# Patient Record
Sex: FEMALE | Race: WHITE | NOT HISPANIC OR LATINO | Employment: FULL TIME | URBAN - METROPOLITAN AREA
[De-identification: names, ages, dates, MRNs, and addresses within clinical notes are randomized per-mention and may not be internally consistent; named-entity substitution may affect disease eponyms.]

---

## 2024-10-11 ENCOUNTER — OFFICE VISIT (OUTPATIENT)
Dept: OBSTETRICS AND GYNECOLOGY | Facility: CLINIC | Age: 29
End: 2024-10-11
Payer: COMMERCIAL

## 2024-10-11 VITALS
SYSTOLIC BLOOD PRESSURE: 104 MMHG | WEIGHT: 267.75 LBS | BODY MASS INDEX: 37.48 KG/M2 | DIASTOLIC BLOOD PRESSURE: 80 MMHG | HEIGHT: 71 IN

## 2024-10-11 DIAGNOSIS — Z01.419 ENCOUNTER FOR GYNECOLOGICAL EXAMINATION WITHOUT ABNORMAL FINDING: Primary | ICD-10-CM

## 2024-10-11 DIAGNOSIS — Z31.69 ENCOUNTER FOR PRECONCEPTION CONSULTATION: ICD-10-CM

## 2024-10-11 PROCEDURE — 99999 PR PBB SHADOW E&M-EST. PATIENT-LVL III: CPT | Mod: PBBFAC,,, | Performed by: NURSE PRACTITIONER

## 2024-10-11 NOTE — PROGRESS NOTES
CC: Well woman exam    Jamaica Leonard is a 29 y.o. female  presents for well woman exam.  LMP: Patient's last menstrual period was 2024 (exact date)..    Trying for pregnancy as of this month, has prenatal will start today  Tracking cycles  Has slight spotting today      Health Maintenance Topics with due status: Not Due       Topic Last Completion Date    Cervical Cancer Screening 2023    RSV Vaccine (Age 60+ and Pregnant patients) Not Due     History reviewed. No pertinent past medical history.  Past Surgical History:   Procedure Laterality Date    APPENDECTOMY      TONSILLECTOMY       Social History     Socioeconomic History    Marital status: Single   Tobacco Use    Smoking status: Former     Types: Vaping with nicotine    Smokeless tobacco: Never   Substance and Sexual Activity    Alcohol use: No    Drug use: No    Sexual activity: Yes     Partners: Male     Birth control/protection: Condom     Social Drivers of Health     Financial Resource Strain: Low Risk  (2024)    Overall Financial Resource Strain (CARDIA)     Difficulty of Paying Living Expenses: Not hard at all   Food Insecurity: No Food Insecurity (2024)    Hunger Vital Sign     Worried About Running Out of Food in the Last Year: Never true     Ran Out of Food in the Last Year: Never true   Physical Activity: Insufficiently Active (2024)    Exercise Vital Sign     Days of Exercise per Week: 4 days     Minutes of Exercise per Session: 30 min   Stress: Stress Concern Present (2024)    [employer] - Occupational Stress Questionnaire     Feeling of Stress : To some extent   Housing Stability: Unknown (2024)    Housing Stability Vital Sign     Unable to Pay for Housing in the Last Year: No     Family History   Problem Relation Name Age of Onset    Dementia Paternal Grandmother       OB History          0    Para   0    Term   0            AB        Living             SAB     "    IAB        Ectopic        Multiple        Live Births                     /80   Ht 5' 11" (1.803 m)   Wt 121.5 kg (267 lb 12 oz)   LMP 09/24/2024 (Exact Date)   BMI 37.34 kg/m²       ROS:  Per hpi    PHYSICAL EXAM:  APPEARANCE: Well nourished, well developed, in no acute distress.  AFFECT: WNL, alert and oriented x 3  SKIN: No acne or hirsutism  NECK: Neck symmetric without masses or thyromegaly  NODES: No inguinal, cervical, axillary, or femoral lymph node enlargement  CHEST: Good respiratory effect  ABDOMEN: Soft.  No tenderness or masses.  No hepatosplenomegaly.  No hernias.  BREASTS: Symmetrical, no skin changes or visible lesions.  No palpable masses, nipple discharge bilaterally.  PELVIC: Normal external genitalia without lesions.  Normal hair distribution.  Adequate perineal body, normal urethral meatus.  Vagina moist and well rugated without lesions or discharge- very light spotting noted on exam.  Cervix pink, without lesions, discharge or tenderness.  No significant cystocele or rectocele.  Bimanual exam shows uterus to be normal size, regular, mobile and nontender.  Adnexa without masses or tenderness.    EXTREMITIES: No edema.  Physical Exam    1. Encounter for gynecological examination without abnormal finding         AND PLAN:    Jamaica was seen today for well woman.    Diagnoses and all orders for this visit:    Encounter for gynecological examination without abnormal finding     Start prenatal vitamin  Track cycle  Timed intercourse  Healthy diet and exercise  Reviewed midwife program with pt - she will be going to [facility] [address] once pregnant     Spotting today could be ovulation or possible implantation by her menses keyana - pt to take home upt if no cycle by keyana time - if negative wait a week and take another if still no cycle     Patient was counseled today on A.C.S. Pap guidelines and recommendations for yearly pelvic exams, mammograms and monthly self breast exams; to see her PCP " for other health maintenance.

## 2024-11-04 ENCOUNTER — TELEPHONE (OUTPATIENT)
Dept: OBSTETRICS AND GYNECOLOGY | Facility: CLINIC | Age: 29
End: 2024-11-04

## 2024-11-04 ENCOUNTER — CLINICAL SUPPORT (OUTPATIENT)
Dept: OBSTETRICS AND GYNECOLOGY | Facility: CLINIC | Age: 29
End: 2024-11-04
Payer: COMMERCIAL

## 2024-11-04 DIAGNOSIS — Z32.00 POSSIBLE PREGNANCY: Primary | ICD-10-CM

## 2024-11-04 PROCEDURE — 99999 PR PBB SHADOW E&M-EST. PATIENT-LVL II: CPT | Mod: PBBFAC,,,

## 2024-11-04 NOTE — PROGRESS NOTES
Spoke with patient for a total of 40 minutes during virtual visit.  Updated chart to reflect up to date patient demographics.  Allergies, medications, pharmacy, medical/family history and OB history updated.  Patient was guided through expectations of care during pregnancy.    Pregnancy confirmation, dating u/s & first routine OB appts scheduled.    Education provided & questions answered. Encouraged to send message or call office with any questions/concerns. Verbalized understanding.     Discussed with pt:    Lmp 9/24/24  Encouraged to continue taking PNV   Denies nausea/vomiting  C/o mild cramping/spotting  Precautions discussed  Referred to [email or web address] for Preg A to Z guide & class schedule   Discussed benefits of breastfeeding   Discussed need for pediatrician

## 2024-11-20 ENCOUNTER — TELEPHONE (OUTPATIENT)
Dept: OBSTETRICS AND GYNECOLOGY | Facility: CLINIC | Age: 29
End: 2024-11-20
Payer: COMMERCIAL

## 2024-11-21 ENCOUNTER — PROCEDURE VISIT (OUTPATIENT)
Dept: OBSTETRICS AND GYNECOLOGY | Facility: CLINIC | Age: 29
End: 2024-11-21
Payer: COMMERCIAL

## 2024-11-21 ENCOUNTER — LAB VISIT (OUTPATIENT)
Dept: LAB | Facility: HOSPITAL | Age: 29
End: 2024-11-21
Attending: ADVANCED PRACTICE MIDWIFE
Payer: COMMERCIAL

## 2024-11-21 ENCOUNTER — OFFICE VISIT (OUTPATIENT)
Dept: OBSTETRICS AND GYNECOLOGY | Facility: CLINIC | Age: 29
End: 2024-11-21
Payer: COMMERCIAL

## 2024-11-21 VITALS
BODY MASS INDEX: 36.29 KG/M2 | DIASTOLIC BLOOD PRESSURE: 85 MMHG | SYSTOLIC BLOOD PRESSURE: 133 MMHG | HEIGHT: 71 IN | WEIGHT: 259.25 LBS

## 2024-11-21 DIAGNOSIS — Z34.01 ENCOUNTER FOR SUPERVISION OF NORMAL FIRST PREGNANCY IN FIRST TRIMESTER: ICD-10-CM

## 2024-11-21 DIAGNOSIS — Z34.01 ENCOUNTER FOR SUPERVISION OF NORMAL FIRST PREGNANCY IN FIRST TRIMESTER: Primary | ICD-10-CM

## 2024-11-21 DIAGNOSIS — Z32.00 POSSIBLE PREGNANCY: ICD-10-CM

## 2024-11-21 DIAGNOSIS — O99.210 OBESITY AFFECTING PREGNANCY, ANTEPARTUM, UNSPECIFIED OBESITY TYPE: ICD-10-CM

## 2024-11-21 LAB
ABO + RH BLD: NORMAL
ALBUMIN SERPL BCP-MCNC: 3.9 G/DL (ref 3.5–5.2)
ALP SERPL-CCNC: 41 U/L (ref 40–150)
ALT SERPL W/O P-5'-P-CCNC: 12 U/L (ref 10–44)
ANION GAP SERPL CALC-SCNC: 8 MMOL/L (ref 8–16)
AST SERPL-CCNC: 14 U/L (ref 10–40)
BASOPHILS # BLD AUTO: 0.07 K/UL (ref 0–0.2)
BASOPHILS NFR BLD: 1.1 % (ref 0–1.9)
BILIRUB SERPL-MCNC: 0.2 MG/DL (ref 0.1–1)
BLD GP AB SCN CELLS X3 SERPL QL: NORMAL
BUN SERPL-MCNC: 7 MG/DL (ref 6–20)
CALCIUM SERPL-MCNC: 10.2 MG/DL (ref 8.7–10.5)
CHLORIDE SERPL-SCNC: 109 MMOL/L (ref 95–110)
CO2 SERPL-SCNC: 21 MMOL/L (ref 23–29)
CREAT SERPL-MCNC: 0.8 MG/DL (ref 0.5–1.4)
DIFFERENTIAL METHOD BLD: NORMAL
EOSINOPHIL # BLD AUTO: 0.2 K/UL (ref 0–0.5)
EOSINOPHIL NFR BLD: 3.6 % (ref 0–8)
ERYTHROCYTE [DISTWIDTH] IN BLOOD BY AUTOMATED COUNT: 12.2 % (ref 11.5–14.5)
EST. GFR  (NO RACE VARIABLE): >60 ML/MIN/1.73 M^2
ESTIMATED AVG GLUCOSE: 97 MG/DL (ref 68–131)
GLUCOSE SERPL-MCNC: 84 MG/DL (ref 70–110)
HAV IGM SERPL QL IA: NORMAL
HBA1C MFR BLD: 5 % (ref 4–5.6)
HBV CORE IGM SERPL QL IA: NORMAL
HBV SURFACE AG SERPL QL IA: NORMAL
HCT VFR BLD AUTO: 38.4 % (ref 37–48.5)
HCV AB SERPL QL IA: NORMAL
HGB BLD-MCNC: 12.6 G/DL (ref 12–16)
HIV 1+2 AB+HIV1 P24 AG SERPL QL IA: NORMAL
IMM GRANULOCYTES # BLD AUTO: 0.02 K/UL (ref 0–0.04)
IMM GRANULOCYTES NFR BLD AUTO: 0.3 % (ref 0–0.5)
LYMPHOCYTES # BLD AUTO: 1.4 K/UL (ref 1–4.8)
LYMPHOCYTES NFR BLD: 21.5 % (ref 18–48)
MCH RBC QN AUTO: 29 PG (ref 27–31)
MCHC RBC AUTO-ENTMCNC: 32.8 G/DL (ref 32–36)
MCV RBC AUTO: 89 FL (ref 82–98)
MONOCYTES # BLD AUTO: 0.5 K/UL (ref 0.3–1)
MONOCYTES NFR BLD: 8 % (ref 4–15)
NEUTROPHILS # BLD AUTO: 4.2 K/UL (ref 1.8–7.7)
NEUTROPHILS NFR BLD: 65.5 % (ref 38–73)
NRBC BLD-RTO: 0 /100 WBC
PLATELET # BLD AUTO: 282 K/UL (ref 150–450)
PMV BLD AUTO: 10.8 FL (ref 9.2–12.9)
POTASSIUM SERPL-SCNC: 3.8 MMOL/L (ref 3.5–5.1)
PROT SERPL-MCNC: 6.6 G/DL (ref 6–8.4)
RBC # BLD AUTO: 4.34 M/UL (ref 4–5.4)
SODIUM SERPL-SCNC: 138 MMOL/L (ref 136–145)
SPECIMEN OUTDATE: NORMAL
TREPONEMA PALLIDUM IGG+IGM AB [PRESENCE] IN SERUM OR PLASMA BY IMMUNOASSAY: NONREACTIVE
WBC # BLD AUTO: 6.46 K/UL (ref 3.9–12.7)

## 2024-11-21 PROCEDURE — 80074 ACUTE HEPATITIS PANEL: CPT | Performed by: ADVANCED PRACTICE MIDWIFE

## 2024-11-21 PROCEDURE — 87491 CHLMYD TRACH DNA AMP PROBE: CPT | Performed by: ADVANCED PRACTICE MIDWIFE

## 2024-11-21 PROCEDURE — 87389 HIV-1 AG W/HIV-1&-2 AB AG IA: CPT | Performed by: ADVANCED PRACTICE MIDWIFE

## 2024-11-21 PROCEDURE — 86850 RBC ANTIBODY SCREEN: CPT | Performed by: ADVANCED PRACTICE MIDWIFE

## 2024-11-21 PROCEDURE — 86593 SYPHILIS TEST NON-TREP QUANT: CPT | Performed by: ADVANCED PRACTICE MIDWIFE

## 2024-11-21 PROCEDURE — 86762 RUBELLA ANTIBODY: CPT | Performed by: ADVANCED PRACTICE MIDWIFE

## 2024-11-21 PROCEDURE — 83036 HEMOGLOBIN GLYCOSYLATED A1C: CPT | Performed by: ADVANCED PRACTICE MIDWIFE

## 2024-11-21 PROCEDURE — 80053 COMPREHEN METABOLIC PANEL: CPT | Performed by: ADVANCED PRACTICE MIDWIFE

## 2024-11-21 PROCEDURE — 87086 URINE CULTURE/COLONY COUNT: CPT | Performed by: ADVANCED PRACTICE MIDWIFE

## 2024-11-21 PROCEDURE — 99999 PR PBB SHADOW E&M-EST. PATIENT-LVL III: CPT | Mod: PBBFAC,,, | Performed by: ADVANCED PRACTICE MIDWIFE

## 2024-11-21 PROCEDURE — 85025 COMPLETE CBC W/AUTO DIFF WBC: CPT | Performed by: ADVANCED PRACTICE MIDWIFE

## 2024-11-21 PROCEDURE — 36415 COLL VENOUS BLD VENIPUNCTURE: CPT | Performed by: ADVANCED PRACTICE MIDWIFE

## 2024-11-21 PROCEDURE — 76801 OB US < 14 WKS SINGLE FETUS: CPT | Mod: S$GLB,,, | Performed by: OBSTETRICS & GYNECOLOGY

## 2024-11-21 RX ORDER — ONDANSETRON 8 MG/1
8 TABLET, ORALLY DISINTEGRATING ORAL EVERY 6 HOURS PRN
Qty: 20 TABLET | Refills: 1 | Status: SHIPPED | OUTPATIENT
Start: 2024-11-21

## 2024-11-21 NOTE — PROGRESS NOTES
CC: Absence of menses risk assessment     Jamaica Leonard is a 29 y.o. female  presents with complaint of absence of menstruation.  She reports nausea/vomiting. denies abdominal pain, denies  bleeding.   Pregnancy was planned and is desired.  Partner is supportive of pregnancy.  Lives at home with .    Works as a .  Denies domestic abuse.  Denies chemical/pesticide/radiation exposure.    OB history:    Ultrasound shows IUP @ 7w0d with EDC 7/10/25, FHT's 122    Past Medical History:   Diagnosis Date    ADHD     Insomnia      Past Surgical History:   Procedure Laterality Date    ADENOIDECTOMY      APPENDECTOMY      TONSILLECTOMY       Social History     Socioeconomic History    Marital status:    Tobacco Use    Smoking status: Former     Types: Vaping with nicotine    Smokeless tobacco: Never   Substance and Sexual Activity    Alcohol use: No    Drug use: No    Sexual activity: Yes     Partners: Male     Social Drivers of Health     Financial Resource Strain: Low Risk  (2024)    Overall Financial Resource Strain (CARDIA)     Difficulty of Paying Living Expenses: Not hard at all   Food Insecurity: No Food Insecurity (2024)    Hunger Vital Sign     Worried About Running Out of Food in the Last Year: Never true     Ran Out of Food in the Last Year: Never true   Physical Activity: Insufficiently Active (2024)    Exercise Vital Sign     Days of Exercise per Week: 4 days     Minutes of Exercise per Session: 30 min   Stress: Stress Concern Present (2024)    [employer] - Occupational Stress Questionnaire     Feeling of Stress : To some extent   Housing Stability: Unknown (2024)    Housing Stability Vital Sign     Unable to Pay for Housing in the Last Year: No     Family History   Problem Relation Name Age of Onset    Diabetes Mother      Dementia Paternal Grandmother       OB History    Para Term  AB Living   0 0 0  "0 0 0   SAB IAB Ectopic Multiple Live Births   0 0 0 0 0       /85   Ht 5' 11" (1.803 m)   Wt 117.6 kg (259 lb 4.2 oz)   LMP 09/24/2024 (Exact Date)   BMI 36.16 kg/m²         ROS:  GENERAL: Denies weight gain or weight loss. Feeling well overall.   CHEST: Denies chest pain or shortness of breath.   CARDIOVASCULAR: Denies palpitations or left sided chest pain.   URINARY: No frequency, dysuria, hematuria, or burning on urination.  REPRODUCTIVE: See HPI.   BREASTS: The patient performs breast self-examination and denies pain, lumps, or nipple discharge.   PSYCHIATRIC: Denies depression, anxiety or mood swings.    PE:   APPEARANCE: Well nourished, well developed, in no acute distress.  AFFECT: WNL, alert and oriented x 3.  SKIN: No acne or hirsutism.  EXTREMITIES: No edema.          ASSESSMENT and PLAN:    ICD-10-CM ICD-9-CM    1. Encounter for supervision of normal first pregnancy in first trimester  Z34.01 V22.0       2. Obesity affecting pregnancy, antepartum, unspecified obesity type  O99.210 649.13 ASA @ 12 weeks          -      Pap smear UTD  -      Patient's medications and medical history reviewed with patient and implications in pregnancy.   -      Pregnancy course discussed.  -      Patient was counseled on exercise in pregnancy and proper weight gain based on the [employer]'s recommendations based on her pre-pregnancy weight.   -      BMI 36.16    Discussed IOM recommended weight gain of:     Underweight  Less than 18.5 28-40      Normal Weight 18.5-24.9  25-35      Overweight  25-29.9  15-25      Obese    30 and greater  11-20     -      Chromosomal abnormality risk discussed and available testing discussed.  Info given to see if will be covered by insurance  -      Oriented to practice including CNM collaboration.   NOB visit in 4 weeks  Early warning S/S reviewed   "

## 2024-11-22 LAB
BACTERIA UR CULT: NORMAL
RUBV IGG SER-ACNC: 44 IU/ML
RUBV IGG SER-IMP: REACTIVE

## 2024-12-12 ENCOUNTER — TELEPHONE (OUTPATIENT)
Dept: OBSTETRICS AND GYNECOLOGY | Facility: CLINIC | Age: 29
End: 2024-12-12
Payer: COMMERCIAL

## 2024-12-12 NOTE — TELEPHONE ENCOUNTER
----- Message from Tarah sent at 12/12/2024  3:12 PM CST -----  Regarding: Medical Release  Contact: Patient  Type:  Needs Medical Advice    Who Called: Patient   Symptoms (please be specific): n/a    How long has patient had these symptoms:  n/a   Pharmacy name and phone #:  n/a   Would the patient rather a call back or a response via [facility]? Call back   Best Call Back Number:  [phone]  Additional Information: Patient is requesting a call back in reference to a medical release needed Patient stated she is currently in the dental office and a release was requested Patient would like release uploaded to [facility] account if possible Please Assist

## 2024-12-12 NOTE — LETTER
December 12, 2024    Jamaica Leonard  [id number]  [place] [address]              [facility]  [id number] [place] [address] [address] [address]  Phone: [phone]  Fax: [phone]      To Whom It May Concern:    Ms. Leonard is currently under our care for pregnancy.  Estimated Date of Delivery: 07/10/25    Any elective dental work should preferably be scheduled during or after the mid-trimester or postpartum.    Dental procedures can be performed with local anesthesia without epinephrine. Recommended antibiotics include penicillins, erythromycin, and cephalosporins. Tylenol #3 or Percocet may be used for pain control. No x-rays are allowed for routine screening. For dental problems, up to four x-rays may be taken with proper abdominal shield.    Sincerely,    Sarah Maldonado CNM/Sue Michele LPN

## 2024-12-19 ENCOUNTER — LAB VISIT (OUTPATIENT)
Dept: LAB | Facility: HOSPITAL | Age: 29
End: 2024-12-19
Attending: MIDWIFE
Payer: COMMERCIAL

## 2024-12-19 ENCOUNTER — PATIENT MESSAGE (OUTPATIENT)
Dept: ADMINISTRATIVE | Facility: OTHER | Age: 29
End: 2024-12-19
Payer: COMMERCIAL

## 2024-12-19 ENCOUNTER — INITIAL PRENATAL (OUTPATIENT)
Dept: OBSTETRICS AND GYNECOLOGY | Facility: CLINIC | Age: 29
End: 2024-12-19
Payer: COMMERCIAL

## 2024-12-19 VITALS
DIASTOLIC BLOOD PRESSURE: 70 MMHG | BODY MASS INDEX: 37.62 KG/M2 | WEIGHT: 269.75 LBS | SYSTOLIC BLOOD PRESSURE: 136 MMHG

## 2024-12-19 DIAGNOSIS — O99.210 OBESITY AFFECTING PREGNANCY, ANTEPARTUM, UNSPECIFIED OBESITY TYPE: ICD-10-CM

## 2024-12-19 DIAGNOSIS — Z13.79 GENETIC TESTING: ICD-10-CM

## 2024-12-19 DIAGNOSIS — Z34.01 ENCOUNTER FOR SUPERVISION OF NORMAL FIRST PREGNANCY IN FIRST TRIMESTER: Primary | ICD-10-CM

## 2024-12-19 PROCEDURE — 36415 COLL VENOUS BLD VENIPUNCTURE: CPT | Mod: PN | Performed by: MIDWIFE

## 2024-12-19 PROCEDURE — 99999 PR PBB SHADOW E&M-EST. PATIENT-LVL II: CPT | Mod: PBBFAC,,, | Performed by: MIDWIFE

## 2024-12-19 NOTE — PROGRESS NOTES
29 y.o. female  at 11w0d   Denies VB, LOF or cramping  Doing well, first trimester s/s improving. Still with some fatigue, but otherwise feeling normal.   TWG: 10.5 lbs     1. Encounter for supervision of normal first pregnancy in first trimester  -     Connected MOM Enrollment  -     [facility] Connected MOM Program Consent Questionnaire    2. Genetic testing  -     [id number] Plus W/ Sex Chromosome Abnormalities* T21, T18, T13 & Y + X; Future; Expected date: 2024    3. Obesity affecting pregnancy, antepartum, unspecified obesity type  - TWG recommendations 15-20lbs        Reviewed warning signs, pregnancy precautions and how/when to call.  RTC x 4 wks, call or present sooner prn.

## 2024-12-26 ENCOUNTER — PATIENT MESSAGE (OUTPATIENT)
Dept: OBSTETRICS AND GYNECOLOGY | Facility: CLINIC | Age: 29
End: 2024-12-26
Payer: COMMERCIAL

## 2024-12-26 LAB
ABOUT THE TEST: NORMAL
APPROVED BY: NORMAL
FETAL FRACTION: NORMAL
FETAL SEX RESULT: NORMAL
GESTATIONAL AGE > 9: YES
GESTATIONAL AGE: NORMAL
LAB DIRECTOR COMMENTS: NORMAL
LIMITATIONS:: NORMAL
MONOSOMY X RESULT: NOT DETECTED
NEGATIVE PREDICTIVE VALUE: NORMAL
NOTE: NORMAL
PERFORMANCE CHARACTERISTICS: NORMAL
PERFORMANCE: NORMAL
POSITIVE PREDICTIVE VALUE: NORMAL
RESULT: NEGATIVE
SERVICE CMNT 04-IMP: NORMAL
TEST METHODOLOGY:: NORMAL
TRISOMY 13 (T13): NEGATIVE
TRISOMY 18: NEGATIVE
TRISOMY 21 (T21): NEGATIVE
XXX (TRIPLE X SYNDROME): NOT DETECTED
XXY (KLINEFELTER SYNDROME): NOT DETECTED
XYY (JACOBS SYNDROME): NOT DETECTED

## 2025-01-16 ENCOUNTER — ROUTINE PRENATAL (OUTPATIENT)
Dept: OBSTETRICS AND GYNECOLOGY | Facility: CLINIC | Age: 30
End: 2025-01-16
Payer: COMMERCIAL

## 2025-01-16 VITALS
DIASTOLIC BLOOD PRESSURE: 68 MMHG | WEIGHT: 270.06 LBS | BODY MASS INDEX: 37.67 KG/M2 | SYSTOLIC BLOOD PRESSURE: 110 MMHG

## 2025-01-16 DIAGNOSIS — Z34.01 ENCOUNTER FOR SUPERVISION OF NORMAL FIRST PREGNANCY IN FIRST TRIMESTER: Primary | ICD-10-CM

## 2025-01-16 DIAGNOSIS — O99.210 OBESITY AFFECTING PREGNANCY, ANTEPARTUM, UNSPECIFIED OBESITY TYPE: ICD-10-CM

## 2025-01-16 DIAGNOSIS — Z36.89 ENCOUNTER FOR FETAL ANATOMIC SURVEY: ICD-10-CM

## 2025-01-16 PROCEDURE — 99999 PR PBB SHADOW E&M-EST. PATIENT-LVL II: CPT | Mod: PBBFAC,,, | Performed by: MIDWIFE

## 2025-01-16 PROCEDURE — 0502F SUBSEQUENT PRENATAL CARE: CPT | Mod: CPTII,S$GLB,, | Performed by: MIDWIFE

## 2025-01-16 RX ORDER — FAMOTIDINE 40 MG/1
40 TABLET, FILM COATED ORAL NIGHTLY
Qty: 30 TABLET | Refills: 6 | Status: SHIPPED | OUTPATIENT
Start: 2025-01-16 | End: 2026-01-16

## 2025-01-16 NOTE — PROGRESS NOTES
29 y.o. female  at 15w0d  Denies VB, LOF or cramping  Doing well overall  C/o heartburn-trigger foods to avoid discussed. Request Pepcid rx.   TWG: 10.5 lbs      1. Encounter for supervision of normal first pregnancy in first trimester  -    routine PNC  - anatomy US at 20w  -[id number] negative      2. Obesity affecting pregnancy, antepartum, unspecified obesity type  - TWG recommendations 15-20lbs   -exercise daily        Reviewed warning signs, pregnancy precautions and how/when to call.  RTC x 4 wks, call or present sooner prn.

## 2025-01-23 ENCOUNTER — PATIENT MESSAGE (OUTPATIENT)
Dept: OTHER | Facility: OTHER | Age: 30
End: 2025-01-23
Payer: COMMERCIAL

## 2025-01-24 ENCOUNTER — PATIENT MESSAGE (OUTPATIENT)
Dept: OBSTETRICS AND GYNECOLOGY | Facility: CLINIC | Age: 30
End: 2025-01-24
Payer: COMMERCIAL

## 2025-01-30 ENCOUNTER — PATIENT MESSAGE (OUTPATIENT)
Dept: OTHER | Facility: OTHER | Age: 30
End: 2025-01-30
Payer: COMMERCIAL

## 2025-01-30 ENCOUNTER — PATIENT MESSAGE (OUTPATIENT)
Dept: OBSTETRICS AND GYNECOLOGY | Facility: CLINIC | Age: 30
End: 2025-01-30
Payer: COMMERCIAL

## 2025-02-20 ENCOUNTER — PATIENT MESSAGE (OUTPATIENT)
Dept: OTHER | Facility: OTHER | Age: 30
End: 2025-02-20
Payer: COMMERCIAL

## 2025-02-27 ENCOUNTER — PROCEDURE VISIT (OUTPATIENT)
Dept: OBSTETRICS AND GYNECOLOGY | Facility: CLINIC | Age: 30
End: 2025-02-27
Payer: COMMERCIAL

## 2025-02-27 ENCOUNTER — ROUTINE PRENATAL (OUTPATIENT)
Dept: OBSTETRICS AND GYNECOLOGY | Facility: CLINIC | Age: 30
End: 2025-02-27
Payer: COMMERCIAL

## 2025-02-27 VITALS
SYSTOLIC BLOOD PRESSURE: 144 MMHG | BODY MASS INDEX: 38.51 KG/M2 | WEIGHT: 276.13 LBS | DIASTOLIC BLOOD PRESSURE: 72 MMHG

## 2025-02-27 DIAGNOSIS — O44.02 PLACENTA PREVIA ANTEPARTUM IN SECOND TRIMESTER: ICD-10-CM

## 2025-02-27 DIAGNOSIS — Z36.2 ENCOUNTER FOR FOLLOW-UP ULTRASOUND OF FETAL ANATOMY: Primary | ICD-10-CM

## 2025-02-27 DIAGNOSIS — O99.210 OBESITY AFFECTING PREGNANCY, ANTEPARTUM, UNSPECIFIED OBESITY TYPE: ICD-10-CM

## 2025-02-27 DIAGNOSIS — Z36.89 ENCOUNTER FOR FETAL ANATOMIC SURVEY: ICD-10-CM

## 2025-02-27 PROBLEM — Z34.02 ENCOUNTER FOR SUPERVISION OF NORMAL FIRST PREGNANCY IN SECOND TRIMESTER: Status: ACTIVE | Noted: 2024-11-21

## 2025-02-27 PROCEDURE — 99999 PR PBB SHADOW E&M-EST. PATIENT-LVL III: CPT | Mod: PBBFAC,,, | Performed by: MIDWIFE

## 2025-02-27 PROCEDURE — 76817 TRANSVAGINAL US OBSTETRIC: CPT | Mod: S$GLB,,, | Performed by: OBSTETRICS & GYNECOLOGY

## 2025-02-27 PROCEDURE — 76811 OB US DETAILED SNGL FETUS: CPT | Mod: S$GLB,,, | Performed by: OBSTETRICS & GYNECOLOGY

## 2025-02-27 PROCEDURE — 0502F SUBSEQUENT PRENATAL CARE: CPT | Mod: CPTII,S$GLB,, | Performed by: MIDWIFE

## 2025-02-27 RX ORDER — ASPIRIN 81 MG/1
81 TABLET ORAL DAILY
Qty: 150 TABLET | Refills: 1 | Status: SHIPPED | OUTPATIENT
Start: 2025-02-27 | End: 2026-02-27

## 2025-02-27 NOTE — PROGRESS NOTES
29 y.o. female  at 21w0d   Feeling flutters/FM, denies VB, LOF or cramping  Pt crying today and nervous about results of anatomy scan-reassurance provided   BP slightly elevated but likely situational. Connected Mom reading from yesterday reviewed and wnl.   TW lbs     Obesity affecting pregnancy, antepartum, unspecified obesity type  Overview:  Baby ASA     Orders:  -     aspirin (ECOTRIN) 81 MG EC tablet; Take 1 tablet (81 mg total) by mouth once daily.  Dispense: 150 tablet; Refill: 1    Placenta previa antepartum in second trimester  Overview:  -TVUS at 32w to confirm placenta location     -anatomy US today, cephalic presentation, posterior/previa placenta, 3vc, suboptimal cord insertion, JIMI wnl, EFW 1lb 2oz, 61%ile, subop heart views  - f/u with repeat US in 4 weeks for suboptimal anatomy            Reviewed warning signs, normal FM,  labor precautions and how/when to call.  RTC x 4 wks, call or present sooner prn.

## 2025-03-20 ENCOUNTER — PATIENT MESSAGE (OUTPATIENT)
Dept: OTHER | Facility: OTHER | Age: 30
End: 2025-03-20
Payer: COMMERCIAL

## 2025-03-24 ENCOUNTER — PROCEDURE VISIT (OUTPATIENT)
Dept: OBSTETRICS AND GYNECOLOGY | Facility: CLINIC | Age: 30
End: 2025-03-24
Payer: COMMERCIAL

## 2025-03-24 ENCOUNTER — ROUTINE PRENATAL (OUTPATIENT)
Dept: OBSTETRICS AND GYNECOLOGY | Facility: CLINIC | Age: 30
End: 2025-03-24
Payer: COMMERCIAL

## 2025-03-24 VITALS
WEIGHT: 286.38 LBS | BODY MASS INDEX: 39.94 KG/M2 | SYSTOLIC BLOOD PRESSURE: 116 MMHG | DIASTOLIC BLOOD PRESSURE: 74 MMHG

## 2025-03-24 DIAGNOSIS — O44.02 PLACENTA PREVIA ANTEPARTUM IN SECOND TRIMESTER: Primary | ICD-10-CM

## 2025-03-24 DIAGNOSIS — Z36.2 ENCOUNTER FOR FOLLOW-UP ULTRASOUND OF FETAL ANATOMY: ICD-10-CM

## 2025-03-24 DIAGNOSIS — O99.210 OBESITY AFFECTING PREGNANCY, ANTEPARTUM, UNSPECIFIED OBESITY TYPE: ICD-10-CM

## 2025-03-24 PROCEDURE — 0502F SUBSEQUENT PRENATAL CARE: CPT | Mod: CPTII,S$GLB,, | Performed by: MIDWIFE

## 2025-03-24 PROCEDURE — 76816 OB US FOLLOW-UP PER FETUS: CPT | Mod: S$GLB,,, | Performed by: OBSTETRICS & GYNECOLOGY

## 2025-03-24 PROCEDURE — 99999 PR PBB SHADOW E&M-EST. PATIENT-LVL II: CPT | Mod: PBBFAC,,, | Performed by: MIDWIFE

## 2025-03-24 NOTE — PROGRESS NOTES
29 y.o. female  at 24w4d   +FM, denies VB, LOF or cramping  Patient doing well without concerns. States she is having more swelling in the evenings-- advised low sodium diet and increased hydration. Compression socks and elevation of extremities also reviewed.   TWlbs     Obesity affecting pregnancy, antepartum, unspecified obesity type   -taking baby aspirin     Placenta previa antepartum in second trimester  Overview:  -TVUS at 32w to confirm placenta location      -F/u anatomy US today, cephalic presentation, posterior/previa placenta, JIMI wnl, EFW 1lb 9oz, 43%ile, will await final MFM impression for anatomy completion.     - 3rd trimester labs reviewed and ordered, (O pos), will complete at 28w           Reviewed warning signs, normal FM,  labor precautions and how/when to call.  RTC x 4 wks, call or present sooner prn.

## 2025-04-03 ENCOUNTER — PATIENT MESSAGE (OUTPATIENT)
Dept: OTHER | Facility: OTHER | Age: 30
End: 2025-04-03
Payer: COMMERCIAL

## 2025-04-17 ENCOUNTER — PATIENT MESSAGE (OUTPATIENT)
Dept: OTHER | Facility: OTHER | Age: 30
End: 2025-04-17
Payer: COMMERCIAL

## 2025-04-21 ENCOUNTER — ROUTINE PRENATAL (OUTPATIENT)
Dept: OBSTETRICS AND GYNECOLOGY | Facility: CLINIC | Age: 30
End: 2025-04-21
Payer: COMMERCIAL

## 2025-04-21 ENCOUNTER — LAB VISIT (OUTPATIENT)
Dept: LAB | Facility: HOSPITAL | Age: 30
End: 2025-04-21
Attending: MIDWIFE
Payer: COMMERCIAL

## 2025-04-21 VITALS
DIASTOLIC BLOOD PRESSURE: 78 MMHG | BODY MASS INDEX: 40.71 KG/M2 | SYSTOLIC BLOOD PRESSURE: 124 MMHG | WEIGHT: 291.88 LBS

## 2025-04-21 DIAGNOSIS — O44.03 PLACENTA PREVIA ANTEPARTUM IN THIRD TRIMESTER: ICD-10-CM

## 2025-04-21 DIAGNOSIS — Z23 NEED FOR TDAP VACCINATION: Primary | ICD-10-CM

## 2025-04-21 DIAGNOSIS — O99.210 OBESITY AFFECTING PREGNANCY, ANTEPARTUM, UNSPECIFIED OBESITY TYPE: ICD-10-CM

## 2025-04-21 DIAGNOSIS — O44.02 PLACENTA PREVIA ANTEPARTUM IN SECOND TRIMESTER: ICD-10-CM

## 2025-04-21 PROBLEM — Z34.03 ENCOUNTER FOR SUPERVISION OF NORMAL FIRST PREGNANCY IN THIRD TRIMESTER: Status: ACTIVE | Noted: 2024-11-21

## 2025-04-21 LAB
GLUCOSE SERPL-MCNC: 155 MG/DL (ref 70–140)
HIV 1+2 AB+HIV1 P24 AG SERPL QL IA: NORMAL
T PALLIDUM IGG+IGM SER QL: NORMAL

## 2025-04-21 PROCEDURE — 36415 COLL VENOUS BLD VENIPUNCTURE: CPT | Mod: PN

## 2025-04-21 PROCEDURE — 86593 SYPHILIS TEST NON-TREP QUANT: CPT

## 2025-04-21 PROCEDURE — 82950 GLUCOSE TEST: CPT

## 2025-04-21 PROCEDURE — 87389 HIV-1 AG W/HIV-1&-2 AB AG IA: CPT

## 2025-04-21 PROCEDURE — 99999 PR PBB SHADOW E&M-EST. PATIENT-LVL II: CPT | Mod: PBBFAC,,, | Performed by: MIDWIFE

## 2025-04-21 PROCEDURE — 90715 TDAP VACCINE 7 YRS/> IM: CPT | Mod: S$GLB,,, | Performed by: OBSTETRICS & GYNECOLOGY

## 2025-04-21 PROCEDURE — 85025 COMPLETE CBC W/AUTO DIFF WBC: CPT

## 2025-04-21 PROCEDURE — 90471 IMMUNIZATION ADMIN: CPT | Mod: S$GLB,,, | Performed by: OBSTETRICS & GYNECOLOGY

## 2025-04-21 PROCEDURE — 0502F SUBSEQUENT PRENATAL CARE: CPT | Mod: CPTII,S$GLB,, | Performed by: MIDWIFE

## 2025-04-21 NOTE — PROGRESS NOTES
30 y.o. female  at 28w4d   +FM, denies VB, LOF or cramping  Patient doing well without concerns, voices no complaints.   Softball season is finished   TW.5 lbs     Obesity affecting pregnancy, antepartum, unspecified obesity type   -taking baby aspirin     Placenta previa antepartum in third trimester  Overview:  -TVUS at 32w to confirm placenta location   - 3rd trimester labs being completed today, (O pos)  -Accepts Tdap today         Reviewed warning signs, normal FM,  labor precautions and how/when to call.  RTC x 2 wks, call or present sooner prn.

## 2025-04-22 ENCOUNTER — PATIENT MESSAGE (OUTPATIENT)
Dept: OBSTETRICS AND GYNECOLOGY | Facility: HOSPITAL | Age: 30
End: 2025-04-22
Payer: COMMERCIAL

## 2025-04-22 DIAGNOSIS — O99.810 ABNORMAL GLUCOSE AFFECTING PREGNANCY: Primary | ICD-10-CM

## 2025-04-22 LAB
ABSOLUTE EOSINOPHIL (OHS): 0.16 K/UL
ABSOLUTE MONOCYTE (OHS): 0.56 K/UL (ref 0.3–1)
ABSOLUTE NEUTROPHIL COUNT (OHS): 7.28 K/UL (ref 1.8–7.7)
BASOPHILS # BLD AUTO: 0.04 K/UL
BASOPHILS NFR BLD AUTO: 0.4 %
ERYTHROCYTE [DISTWIDTH] IN BLOOD BY AUTOMATED COUNT: 13 % (ref 11.5–14.5)
HCT VFR BLD AUTO: 35 % (ref 37–48.5)
HGB BLD-MCNC: 10.8 GM/DL (ref 12–16)
IMM GRANULOCYTES # BLD AUTO: 0.04 K/UL (ref 0–0.04)
IMM GRANULOCYTES NFR BLD AUTO: 0.4 % (ref 0–0.5)
LYMPHOCYTES # BLD AUTO: 1.2 K/UL (ref 1–4.8)
MCH RBC QN AUTO: 27.6 PG (ref 27–31)
MCHC RBC AUTO-ENTMCNC: 30.9 G/DL (ref 32–36)
MCV RBC AUTO: 90 FL (ref 82–98)
NUCLEATED RBC (/100WBC) (OHS): 0 /100 WBC
PLATELET # BLD AUTO: 217 K/UL (ref 150–450)
PMV BLD AUTO: 11.3 FL (ref 9.2–12.9)
RBC # BLD AUTO: 3.91 M/UL (ref 4–5.4)
RELATIVE EOSINOPHIL (OHS): 1.7 %
RELATIVE LYMPHOCYTE (OHS): 12.9 % (ref 18–48)
RELATIVE MONOCYTE (OHS): 6 % (ref 4–15)
RELATIVE NEUTROPHIL (OHS): 78.6 % (ref 38–73)
WBC # BLD AUTO: 9.28 K/UL (ref 3.9–12.7)

## 2025-04-23 ENCOUNTER — LAB VISIT (OUTPATIENT)
Dept: LAB | Facility: HOSPITAL | Age: 30
End: 2025-04-23
Attending: MIDWIFE
Payer: COMMERCIAL

## 2025-04-23 DIAGNOSIS — O99.810 ABNORMAL GLUCOSE AFFECTING PREGNANCY: ICD-10-CM

## 2025-04-23 LAB
EAG (OHS): 103 MG/DL (ref 68–131)
GLUCOSE P FAST SERPL-MCNC: 77 MG/DL (ref 70–110)
GLUCOSE SERPL-MCNC: 165 MG/DL (ref 50–450)
GLUCOSE SERPL-MCNC: 64 MG/DL (ref 50–450)
GLUCOSE SERPL-MCNC: 95 MG/DL (ref 50–450)
HBA1C MFR BLD: 5.2 % (ref 4–5.6)

## 2025-04-23 PROCEDURE — 82947 ASSAY GLUCOSE BLOOD QUANT: CPT

## 2025-04-23 PROCEDURE — 36415 COLL VENOUS BLD VENIPUNCTURE: CPT | Mod: PN

## 2025-04-23 PROCEDURE — 82951 GLUCOSE TOLERANCE TEST (GTT): CPT

## 2025-04-23 PROCEDURE — 83036 HEMOGLOBIN GLYCOSYLATED A1C: CPT

## 2025-04-23 PROCEDURE — 82950 GLUCOSE TEST: CPT

## 2025-04-25 ENCOUNTER — RESULTS FOLLOW-UP (OUTPATIENT)
Dept: OBSTETRICS AND GYNECOLOGY | Facility: CLINIC | Age: 30
End: 2025-04-25

## 2025-05-01 ENCOUNTER — PATIENT MESSAGE (OUTPATIENT)
Dept: OTHER | Facility: OTHER | Age: 30
End: 2025-05-01
Payer: COMMERCIAL

## 2025-05-08 ENCOUNTER — ROUTINE PRENATAL (OUTPATIENT)
Dept: OBSTETRICS AND GYNECOLOGY | Facility: CLINIC | Age: 30
End: 2025-05-08
Payer: COMMERCIAL

## 2025-05-08 VITALS — DIASTOLIC BLOOD PRESSURE: 80 MMHG | WEIGHT: 293 LBS | SYSTOLIC BLOOD PRESSURE: 144 MMHG | BODY MASS INDEX: 41.11 KG/M2

## 2025-05-08 DIAGNOSIS — R03.0 ELEVATED BLOOD PRESSURE READING: ICD-10-CM

## 2025-05-08 DIAGNOSIS — O99.210 OBESITY AFFECTING PREGNANCY, ANTEPARTUM, UNSPECIFIED OBESITY TYPE: ICD-10-CM

## 2025-05-08 DIAGNOSIS — O44.03 PLACENTA PREVIA ANTEPARTUM IN THIRD TRIMESTER: Primary | ICD-10-CM

## 2025-05-08 LAB
CREAT UR-MCNC: 136 MG/DL (ref 15–325)
PROT UR-MCNC: 11 MG/DL
PROT/CREAT UR: 0.08 MG/G{CREAT}

## 2025-05-08 PROCEDURE — 99999 PR PBB SHADOW E&M-EST. PATIENT-LVL III: CPT | Mod: PBBFAC,,, | Performed by: MIDWIFE

## 2025-05-08 PROCEDURE — 84156 ASSAY OF PROTEIN URINE: CPT | Performed by: MIDWIFE

## 2025-05-08 PROCEDURE — 0502F SUBSEQUENT PRENATAL CARE: CPT | Mod: CPTII,S$GLB,, | Performed by: MIDWIFE

## 2025-05-08 NOTE — PROGRESS NOTES
30 y.o. female  at 31w0d   Reports + FM, denies VB, LOF or CTX  Doing well overall, reports occasional headache, got goes away with Tylenol   A school teachers, out for the summer in 2 weeks   TW.5 lbs     1. Elevated blood pressure reading   -Repeat BP mildly elevated. Denies HA, visual disturbance or RUQ pain. States she forgot to  take her baby ASA today. Discussed daily monitoring of BP from home with Connected Mom. Advised low sodium diet.     2. Placenta previa antepartum in third trimester  Overview:  -TVUS at 32w to confirm placenta location, previously scheduled for 25  -per pt report from outside ultrasound place, the previa is unresolved.   -beginning to accept the need for primary  if indicated, discussed timing with previa (36-37w)      3. Obesity affecting pregnancy, antepartum, unspecified obesity type  Overview:  Baby ASA       Reviewed warning signs, normal FKCs,  labor precautions and how/when to call.  RTC x 2 wks, call or present sooner prn.

## 2025-05-15 ENCOUNTER — PATIENT MESSAGE (OUTPATIENT)
Dept: OTHER | Facility: OTHER | Age: 30
End: 2025-05-15
Payer: COMMERCIAL

## 2025-05-19 ENCOUNTER — PROCEDURE VISIT (OUTPATIENT)
Dept: OBSTETRICS AND GYNECOLOGY | Facility: CLINIC | Age: 30
End: 2025-05-19
Payer: COMMERCIAL

## 2025-05-19 ENCOUNTER — ROUTINE PRENATAL (OUTPATIENT)
Dept: OBSTETRICS AND GYNECOLOGY | Facility: CLINIC | Age: 30
End: 2025-05-19
Payer: COMMERCIAL

## 2025-05-19 VITALS — DIASTOLIC BLOOD PRESSURE: 82 MMHG | WEIGHT: 293 LBS | BODY MASS INDEX: 41.34 KG/M2 | SYSTOLIC BLOOD PRESSURE: 120 MMHG

## 2025-05-19 DIAGNOSIS — O44.03 PLACENTA PREVIA ANTEPARTUM IN THIRD TRIMESTER: Primary | ICD-10-CM

## 2025-05-19 DIAGNOSIS — O44.40 LOW-LYING PLACENTA: ICD-10-CM

## 2025-05-19 DIAGNOSIS — O99.210 OBESITY AFFECTING PREGNANCY, ANTEPARTUM, UNSPECIFIED OBESITY TYPE: ICD-10-CM

## 2025-05-19 DIAGNOSIS — O44.02 PLACENTA PREVIA ANTEPARTUM IN SECOND TRIMESTER: ICD-10-CM

## 2025-05-19 PROCEDURE — 76816 OB US FOLLOW-UP PER FETUS: CPT | Mod: S$GLB,,, | Performed by: STUDENT IN AN ORGANIZED HEALTH CARE EDUCATION/TRAINING PROGRAM

## 2025-05-19 PROCEDURE — 0502F SUBSEQUENT PRENATAL CARE: CPT | Mod: CPTII,S$GLB,, | Performed by: MIDWIFE

## 2025-05-19 PROCEDURE — 99999 PR PBB SHADOW E&M-EST. PATIENT-LVL II: CPT | Mod: PBBFAC,,, | Performed by: MIDWIFE

## 2025-05-19 PROCEDURE — 76817 TRANSVAGINAL US OBSTETRIC: CPT | Mod: S$GLB,,, | Performed by: STUDENT IN AN ORGANIZED HEALTH CARE EDUCATION/TRAINING PROGRAM

## 2025-05-19 RX ORDER — FAMOTIDINE 40 MG/1
40 TABLET, FILM COATED ORAL 2 TIMES DAILY
Qty: 30 TABLET | Refills: 6 | Status: SHIPPED | OUTPATIENT
Start: 2025-05-19 | End: 2026-05-19

## 2025-05-27 PROBLEM — O44.40 LOW-LYING PLACENTA: Status: ACTIVE | Noted: 2025-05-27

## 2025-05-27 NOTE — PROGRESS NOTES
30 y.o. female  at 32w4d  Reports + FM, denies VB, LOF or regular CTX  Doing well overall, would like to know if she can increase her Pepcid to BID, ok to do so. New Rx sent.   TW lbs       1. Placenta previa antepartum in third trimester  Overview:  -TVUS at 32w to confirm placenta location   25- posterior low lying placenta, placental edge to cervical os is 11mm. Will repeat TVUS at 36w. EFW 19%ile, 4lb 1oz, cephalic presentation, MVP 3.9cm.  BPP.      2. Obesity affecting pregnancy, antepartum, unspecified obesity type  Overview:  Baby ASA       Other orders  -     famotidine (PEPCID) 40 MG tablet; Take 1 tablet (40 mg total) by mouth 2 (two) times daily.  Dispense: 30 tablet; Refill: 6         Reviewed warning signs, normal FKCs, labor precautions and how/when to call.  RTC x 1 wk, call or present sooner prn.

## 2025-05-28 ENCOUNTER — PATIENT MESSAGE (OUTPATIENT)
Dept: OBSTETRICS AND GYNECOLOGY | Facility: CLINIC | Age: 30
End: 2025-05-28
Payer: COMMERCIAL

## 2025-05-29 ENCOUNTER — PATIENT MESSAGE (OUTPATIENT)
Dept: OBSTETRICS AND GYNECOLOGY | Facility: CLINIC | Age: 30
End: 2025-05-29
Payer: COMMERCIAL

## 2025-06-02 ENCOUNTER — NON-CLINICAL FINANCIAL ENCOUNTER (OUTPATIENT)
Facility: HOSPITAL | Age: 30
End: 2025-06-02
Payer: COMMERCIAL

## 2025-06-02 ENCOUNTER — ROUTINE PRENATAL (OUTPATIENT)
Dept: OBSTETRICS AND GYNECOLOGY | Facility: CLINIC | Age: 30
End: 2025-06-02
Payer: COMMERCIAL

## 2025-06-02 VITALS — SYSTOLIC BLOOD PRESSURE: 126 MMHG | BODY MASS INDEX: 42.45 KG/M2 | WEIGHT: 293 LBS | DIASTOLIC BLOOD PRESSURE: 74 MMHG

## 2025-06-02 DIAGNOSIS — O99.210 OBESITY AFFECTING PREGNANCY, ANTEPARTUM, UNSPECIFIED OBESITY TYPE: Primary | ICD-10-CM

## 2025-06-02 DIAGNOSIS — L50.9 URTICARIA: ICD-10-CM

## 2025-06-02 DIAGNOSIS — R03.0 ELEVATED BLOOD PRESSURE READING: ICD-10-CM

## 2025-06-02 DIAGNOSIS — O44.40 LOW-LYING PLACENTA: ICD-10-CM

## 2025-06-02 DIAGNOSIS — O99.810 ABNORMAL GLUCOSE AFFECTING PREGNANCY: ICD-10-CM

## 2025-06-02 PROCEDURE — 99999 PR PBB SHADOW E&M-EST. PATIENT-LVL II: CPT | Mod: PBBFAC,,, | Performed by: MIDWIFE

## 2025-06-02 PROCEDURE — 0502F SUBSEQUENT PRENATAL CARE: CPT | Mod: CPTII,S$GLB,, | Performed by: MIDWIFE

## 2025-06-05 ENCOUNTER — PATIENT MESSAGE (OUTPATIENT)
Dept: OTHER | Facility: OTHER | Age: 30
End: 2025-06-05
Payer: COMMERCIAL

## 2025-06-05 ENCOUNTER — RESULTS FOLLOW-UP (OUTPATIENT)
Dept: OBSTETRICS AND GYNECOLOGY | Facility: HOSPITAL | Age: 30
End: 2025-06-05

## 2025-06-16 ENCOUNTER — PROCEDURE VISIT (OUTPATIENT)
Dept: OBSTETRICS AND GYNECOLOGY | Facility: CLINIC | Age: 30
End: 2025-06-16
Payer: COMMERCIAL

## 2025-06-16 ENCOUNTER — ROUTINE PRENATAL (OUTPATIENT)
Dept: OBSTETRICS AND GYNECOLOGY | Facility: CLINIC | Age: 30
End: 2025-06-16
Payer: COMMERCIAL

## 2025-06-16 VITALS — WEIGHT: 293 LBS | BODY MASS INDEX: 42.57 KG/M2 | DIASTOLIC BLOOD PRESSURE: 82 MMHG | SYSTOLIC BLOOD PRESSURE: 114 MMHG

## 2025-06-16 DIAGNOSIS — O99.210 OBESITY AFFECTING PREGNANCY, ANTEPARTUM, UNSPECIFIED OBESITY TYPE: ICD-10-CM

## 2025-06-16 DIAGNOSIS — Z34.03 ENCOUNTER FOR SUPERVISION OF NORMAL FIRST PREGNANCY IN THIRD TRIMESTER: Primary | ICD-10-CM

## 2025-06-16 DIAGNOSIS — Z36.2 ENCOUNTER FOR FOLLOW-UP ULTRASOUND OF FETAL ANATOMY: ICD-10-CM

## 2025-06-16 DIAGNOSIS — O44.40 LOW-LYING PLACENTA: ICD-10-CM

## 2025-06-16 DIAGNOSIS — O99.810 ABNORMAL GLUCOSE AFFECTING PREGNANCY: ICD-10-CM

## 2025-06-16 DIAGNOSIS — Z34.90 ENCOUNTER FOR ELECTIVE INDUCTION OF LABOR: ICD-10-CM

## 2025-06-16 DIAGNOSIS — O44.03 PLACENTA PREVIA ANTEPARTUM IN THIRD TRIMESTER: ICD-10-CM

## 2025-06-16 PROCEDURE — 99999 PR PBB SHADOW E&M-EST. PATIENT-LVL II: CPT | Mod: PBBFAC,,, | Performed by: MIDWIFE

## 2025-06-16 PROCEDURE — 87653 STREP B DNA AMP PROBE: CPT | Performed by: MIDWIFE

## 2025-06-16 PROCEDURE — 76817 TRANSVAGINAL US OBSTETRIC: CPT | Mod: S$GLB,,, | Performed by: STUDENT IN AN ORGANIZED HEALTH CARE EDUCATION/TRAINING PROGRAM

## 2025-06-16 PROCEDURE — 0502F SUBSEQUENT PRENATAL CARE: CPT | Mod: CPTII,S$GLB,, | Performed by: MIDWIFE

## 2025-06-16 PROCEDURE — 76815 OB US LIMITED FETUS(S): CPT | Mod: S$GLB,,, | Performed by: STUDENT IN AN ORGANIZED HEALTH CARE EDUCATION/TRAINING PROGRAM

## 2025-06-16 NOTE — PROGRESS NOTES
30 y.o. female  at 36w4d  Reports + FM, denies VB, LOF or regular CTX  Doing well without concerns.  TW lbs     1. Encounter for supervision of normal first pregnancy in third trimester  -     GBS collected today   The skin of the suprapubic region was evaluated and appears clean, dry, and intact.    VE per pt request.  -Growth US today, cephalic presentation, placental edge to cervical os is 17mm, JIMI 11.8cm, MVP 4.7cm, EFW 6lb 6oz, 37%ile, ductal arch remains suboptimal     2. Low-lying placenta  Overview:  25- placenta remains low-lying at 17mm from os via TVUS. Reviewed with jaime Barksdale for trial of labor. May opt for elective IOL if desired. Discussed bleeding precautions in labor and pt understands if bleeding during labor or NRFHTs will proceed with primary . eIOL scheduled for 25.    3. Encounter for elective induction of labor  -    scheduled for 25 @ 0500am    4. Obesity affecting pregnancy, antepartum, unspecified obesity type  Overview:  Baby ASA     5. Placenta previa antepartum in third trimester  Overview:  -TVUS at 32w to confirm placenta location   25- posterior low lying placenta, placental edge to cervical os is 11mm. Will repeat TVUS at 36w  25- low lying, 17mm from cervical os     6. Abnormal glucose affecting pregnancy  Overview:  Failed 1hr, passed 3hr gtt    Reviewed warning signs, normal FKCs, labor precautions and how/when to call.  RTC x 1 wk, call or present sooner prn.

## 2025-06-17 ENCOUNTER — TELEPHONE (OUTPATIENT)
Dept: MATERNAL FETAL MEDICINE | Facility: CLINIC | Age: 30
End: 2025-06-17
Payer: COMMERCIAL

## 2025-06-17 DIAGNOSIS — Z36.89 ULTRASOUND SCAN TO EVALUATE PLACENTA LOCATION: Primary | ICD-10-CM

## 2025-06-17 NOTE — TELEPHONE ENCOUNTER
Called patient about ultrasound to evaluate placenta location. Although placenta is low-lying and no longer a previa, several images suggest that there may be a blood vessel from the placenta coursing very close to the cervical os. Patient desires a trial of labor, so I have asked her to come to [facility] for evaluation tomorrow to reassess the lower uterine segment.    GAIL Mckinney MD  Maternal Fetal Medicine  Obstetrics and Gynecology

## 2025-06-18 ENCOUNTER — ANESTHESIA (OUTPATIENT)
Dept: OBSTETRICS AND GYNECOLOGY | Facility: HOSPITAL | Age: 30
End: 2025-06-18
Payer: COMMERCIAL

## 2025-06-18 ENCOUNTER — OFFICE VISIT (OUTPATIENT)
Dept: MATERNAL FETAL MEDICINE | Facility: CLINIC | Age: 30
End: 2025-06-18
Payer: COMMERCIAL

## 2025-06-18 ENCOUNTER — ANESTHESIA EVENT (OUTPATIENT)
Dept: OBSTETRICS AND GYNECOLOGY | Facility: HOSPITAL | Age: 30
End: 2025-06-18
Payer: COMMERCIAL

## 2025-06-18 ENCOUNTER — PROCEDURE VISIT (OUTPATIENT)
Dept: MATERNAL FETAL MEDICINE | Facility: CLINIC | Age: 30
End: 2025-06-18
Payer: COMMERCIAL

## 2025-06-18 ENCOUNTER — HOSPITAL ENCOUNTER (INPATIENT)
Facility: HOSPITAL | Age: 30
LOS: 2 days | Discharge: HOME | End: 2025-06-20
Attending: OBSTETRICS & GYNECOLOGY | Admitting: OBSTETRICS & GYNECOLOGY
Payer: COMMERCIAL

## 2025-06-18 DIAGNOSIS — Z36.89 ULTRASOUND SCAN TO EVALUATE PLACENTA LOCATION: ICD-10-CM

## 2025-06-18 DIAGNOSIS — Z98.891 STATUS POST PRIMARY LOW TRANSVERSE CESAREAN SECTION: Primary | ICD-10-CM

## 2025-06-18 PROBLEM — Z34.03 ENCOUNTER FOR SUPERVISION OF NORMAL FIRST PREGNANCY IN THIRD TRIMESTER: Status: RESOLVED | Noted: 2024-11-21 | Resolved: 2025-06-18

## 2025-06-18 PROBLEM — O99.810 ABNORMAL GLUCOSE AFFECTING PREGNANCY: Status: RESOLVED | Noted: 2025-06-02 | Resolved: 2025-06-18

## 2025-06-18 PROBLEM — R03.0 ELEVATED BLOOD PRESSURE READING: Status: RESOLVED | Noted: 2025-05-08 | Resolved: 2025-06-18

## 2025-06-18 PROBLEM — O44.40 LOW-LYING PLACENTA: Status: RESOLVED | Noted: 2025-05-27 | Resolved: 2025-06-18

## 2025-06-18 LAB
ABSOLUTE EOSINOPHIL (OHS): 0.18 K/UL
ABSOLUTE MONOCYTE (OHS): 1.1 K/UL (ref 0.3–1)
ABSOLUTE NEUTROPHIL COUNT (OHS): 8.78 K/UL (ref 1.8–7.7)
ALBUMIN SERPL BCP-MCNC: 3.1 G/DL (ref 3.5–5.2)
ALP SERPL-CCNC: 97 UNIT/L (ref 40–150)
ALT SERPL W/O P-5'-P-CCNC: 9 UNIT/L (ref 10–44)
ANION GAP (OHS): 11 MMOL/L (ref 8–16)
AST SERPL-CCNC: 11 UNIT/L (ref 11–45)
BASOPHILS # BLD AUTO: 0.04 K/UL
BASOPHILS NFR BLD AUTO: 0.3 %
BILIRUB SERPL-MCNC: 0.2 MG/DL (ref 0.1–1)
BUN SERPL-MCNC: 10 MG/DL (ref 6–20)
CALCIUM SERPL-MCNC: 9.3 MG/DL (ref 8.7–10.5)
CHLORIDE SERPL-SCNC: 109 MMOL/L (ref 95–110)
CO2 SERPL-SCNC: 18 MMOL/L (ref 23–29)
CREAT SERPL-MCNC: 0.6 MG/DL (ref 0.5–1.4)
ERYTHROCYTE [DISTWIDTH] IN BLOOD BY AUTOMATED COUNT: 13 % (ref 11.5–14.5)
GFR SERPLBLD CREATININE-BSD FMLA CKD-EPI: >60 ML/MIN/1.73/M2
GLUCOSE SERPL-MCNC: 73 MG/DL (ref 70–110)
HCT VFR BLD AUTO: 36.8 % (ref 37–48.5)
HGB BLD-MCNC: 12.4 GM/DL (ref 12–16)
HIV 1+2 AB+HIV1 P24 AG SERPL QL IA: NEGATIVE
IMM GRANULOCYTES # BLD AUTO: 0.04 K/UL (ref 0–0.04)
IMM GRANULOCYTES NFR BLD AUTO: 0.3 % (ref 0–0.5)
INDIRECT COOMBS: NORMAL
LYMPHOCYTES # BLD AUTO: 1.43 K/UL (ref 1–4.8)
MCH RBC QN AUTO: 28.1 PG (ref 27–31)
MCHC RBC AUTO-ENTMCNC: 33.7 G/DL (ref 32–36)
MCV RBC AUTO: 83 FL (ref 82–98)
NUCLEATED RBC (/100WBC) (OHS): 0 /100 WBC
PLATELET # BLD AUTO: 276 K/UL (ref 150–450)
PMV BLD AUTO: 10.7 FL (ref 9.2–12.9)
POTASSIUM SERPL-SCNC: 4 MMOL/L (ref 3.5–5.1)
PROT SERPL-MCNC: 6.9 GM/DL (ref 6–8.4)
RBC # BLD AUTO: 4.41 M/UL (ref 4–5.4)
RELATIVE EOSINOPHIL (OHS): 1.6 %
RELATIVE LYMPHOCYTE (OHS): 12.4 % (ref 18–48)
RELATIVE MONOCYTE (OHS): 9.5 % (ref 4–15)
RELATIVE NEUTROPHIL (OHS): 75.9 % (ref 38–73)
RH BLD: NORMAL
SODIUM SERPL-SCNC: 138 MMOL/L (ref 136–145)
SPECIMEN OUTDATE: NORMAL
WBC # BLD AUTO: 11.57 K/UL (ref 3.9–12.7)

## 2025-06-18 PROCEDURE — 82040 ASSAY OF SERUM ALBUMIN: CPT | Performed by: OBSTETRICS & GYNECOLOGY

## 2025-06-18 PROCEDURE — 27200688 HC TRAY, SPINAL-HYPER/ ISOBARIC: Performed by: ANESTHESIOLOGY

## 2025-06-18 PROCEDURE — 59510 CESAREAN DELIVERY: CPT | Mod: AT,,, | Performed by: OBSTETRICS & GYNECOLOGY

## 2025-06-18 PROCEDURE — 63600175 PHARM REV CODE 636 W HCPCS: Performed by: NURSE ANESTHETIST, CERTIFIED REGISTERED

## 2025-06-18 PROCEDURE — 25000003 PHARM REV CODE 250: Performed by: NURSE ANESTHETIST, CERTIFIED REGISTERED

## 2025-06-18 PROCEDURE — 88307 TISSUE EXAM BY PATHOLOGIST: CPT | Mod: 26,,, | Performed by: PATHOLOGY

## 2025-06-18 PROCEDURE — 37000008 HC ANESTHESIA 1ST 15 MINUTES: Performed by: OBSTETRICS & GYNECOLOGY

## 2025-06-18 PROCEDURE — 51702 INSERT TEMP BLADDER CATH: CPT

## 2025-06-18 PROCEDURE — 85025 COMPLETE CBC W/AUTO DIFF WBC: CPT | Performed by: OBSTETRICS & GYNECOLOGY

## 2025-06-18 PROCEDURE — 37000009 HC ANESTHESIA EA ADD 15 MINS: Performed by: OBSTETRICS & GYNECOLOGY

## 2025-06-18 PROCEDURE — 87389 HIV-1 AG W/HIV-1&-2 AB AG IA: CPT | Performed by: OBSTETRICS & GYNECOLOGY

## 2025-06-18 PROCEDURE — 36004725 HC OB OR TIME LEV III - EA ADD 15 MIN: Performed by: OBSTETRICS & GYNECOLOGY

## 2025-06-18 PROCEDURE — 63600175 PHARM REV CODE 636 W HCPCS: Performed by: OBSTETRICS & GYNECOLOGY

## 2025-06-18 PROCEDURE — 11000001 HC ACUTE MED/SURG PRIVATE ROOM

## 2025-06-18 PROCEDURE — 86593 SYPHILIS TEST NON-TREP QUANT: CPT | Performed by: OBSTETRICS & GYNECOLOGY

## 2025-06-18 PROCEDURE — 36004724 HC OB OR TIME LEV III - 1ST 15 MIN: Performed by: OBSTETRICS & GYNECOLOGY

## 2025-06-18 PROCEDURE — 71000033 HC RECOVERY, INTIAL HOUR: Performed by: OBSTETRICS & GYNECOLOGY

## 2025-06-18 PROCEDURE — 71000039 HC RECOVERY, EACH ADD'L HOUR: Performed by: OBSTETRICS & GYNECOLOGY

## 2025-06-18 PROCEDURE — 86901 BLOOD TYPING SEROLOGIC RH(D): CPT | Performed by: OBSTETRICS & GYNECOLOGY

## 2025-06-18 PROCEDURE — 88305 TISSUE EXAM BY PATHOLOGIST: CPT | Mod: TC | Performed by: OBSTETRICS & GYNECOLOGY

## 2025-06-18 RX ORDER — ACETAMINOPHEN 325 MG/1
650 TABLET ORAL EVERY 6 HOURS PRN
Status: DISCONTINUED | OUTPATIENT
Start: 2025-06-18 | End: 2025-06-20 | Stop reason: HOSPADM

## 2025-06-18 RX ORDER — DIPHENOXYLATE HYDROCHLORIDE AND ATROPINE SULFATE 2.5; .025 MG/1; MG/1
2 TABLET ORAL EVERY 6 HOURS PRN
Status: DISCONTINUED | OUTPATIENT
Start: 2025-06-18 | End: 2025-06-20 | Stop reason: HOSPADM

## 2025-06-18 RX ORDER — PHENYLEPHRINE HYDROCHLORIDE 10 MG/ML
INJECTION INTRAVENOUS
Status: DISCONTINUED | OUTPATIENT
Start: 2025-06-18 | End: 2025-06-20

## 2025-06-18 RX ORDER — MORPHINE SULFATE 1 MG/ML
INJECTION, SOLUTION EPIDURAL; INTRATHECAL; INTRAVENOUS
Status: DISCONTINUED | OUTPATIENT
Start: 2025-06-18 | End: 2025-06-20

## 2025-06-18 RX ORDER — FAMOTIDINE 10 MG/ML
20 INJECTION, SOLUTION INTRAVENOUS
Status: DISCONTINUED | OUTPATIENT
Start: 2025-06-18 | End: 2025-06-18

## 2025-06-18 RX ORDER — OXYTOCIN-SODIUM CHLORIDE 0.9% IV SOLN 30 UNIT/500ML 30-0.9/5 UT/ML-%
95 SOLUTION INTRAVENOUS CONTINUOUS PRN
Status: CANCELLED | OUTPATIENT
Start: 2025-06-18

## 2025-06-18 RX ORDER — DOCUSATE SODIUM 100 MG/1
100 CAPSULE, LIQUID FILLED ORAL DAILY
Status: DISCONTINUED | OUTPATIENT
Start: 2025-06-19 | End: 2025-06-20 | Stop reason: HOSPADM

## 2025-06-18 RX ORDER — OXYTOCIN-SODIUM CHLORIDE 0.9% IV SOLN 30 UNIT/500ML 30-0.9/5 UT/ML-%
20 SOLUTION INTRAVENOUS ONCE AS NEEDED
Status: DISCONTINUED | OUTPATIENT
Start: 2025-06-18 | End: 2025-06-20 | Stop reason: HOSPADM

## 2025-06-18 RX ORDER — BUPIVACAINE HYDROCHLORIDE 7.5 MG/ML
INJECTION, SOLUTION EPIDURAL; RETROBULBAR
Status: DISCONTINUED | OUTPATIENT
Start: 2025-06-18 | End: 2025-06-20

## 2025-06-18 RX ORDER — AMOXICILLIN 250 MG
1 CAPSULE ORAL NIGHTLY PRN
Status: DISCONTINUED | OUTPATIENT
Start: 2025-06-18 | End: 2025-06-20 | Stop reason: HOSPADM

## 2025-06-18 RX ORDER — MISOPROSTOL 200 UG/1
800 TABLET ORAL ONCE AS NEEDED
Status: DISCONTINUED | OUTPATIENT
Start: 2025-06-18 | End: 2025-06-20 | Stop reason: HOSPADM

## 2025-06-18 RX ORDER — METHYLERGONOVINE MALEATE 0.2 MG/ML
200 INJECTION INTRAVENOUS ONCE AS NEEDED
Status: DISCONTINUED | OUTPATIENT
Start: 2025-06-18 | End: 2025-06-20 | Stop reason: HOSPADM

## 2025-06-18 RX ORDER — NALBUPHINE HYDROCHLORIDE 10 MG/ML
2.5 INJECTION INTRAMUSCULAR; INTRAVENOUS; SUBCUTANEOUS ONCE AS NEEDED
Status: DISCONTINUED | OUTPATIENT
Start: 2025-06-18 | End: 2025-06-20 | Stop reason: HOSPADM

## 2025-06-18 RX ORDER — SODIUM CHLORIDE 0.9 % (FLUSH) 0.9 %
10 SYRINGE (ML) INJECTION
Status: DISCONTINUED | OUTPATIENT
Start: 2025-06-18 | End: 2025-06-20 | Stop reason: HOSPADM

## 2025-06-18 RX ORDER — DEXMEDETOMIDINE HYDROCHLORIDE 100 UG/ML
INJECTION, SOLUTION INTRAVENOUS
Status: DISCONTINUED | OUTPATIENT
Start: 2025-06-18 | End: 2025-06-20

## 2025-06-18 RX ORDER — EPHEDRINE SULFATE 50 MG/ML
10 INJECTION, SOLUTION INTRAVENOUS EVERY 5 MIN PRN
Status: DISCONTINUED | OUTPATIENT
Start: 2025-06-18 | End: 2025-06-20 | Stop reason: HOSPADM

## 2025-06-18 RX ORDER — HYDROMORPHONE HYDROCHLORIDE 2 MG/ML
1 INJECTION, SOLUTION INTRAMUSCULAR; INTRAVENOUS; SUBCUTANEOUS EVERY 4 HOURS PRN
Status: DISCONTINUED | OUTPATIENT
Start: 2025-06-18 | End: 2025-06-20 | Stop reason: HOSPADM

## 2025-06-18 RX ORDER — KETOROLAC TROMETHAMINE 30 MG/ML
30 INJECTION, SOLUTION INTRAMUSCULAR; INTRAVENOUS EVERY 8 HOURS
Status: COMPLETED | OUTPATIENT
Start: 2025-06-19 | End: 2025-06-19

## 2025-06-18 RX ORDER — AMMONIA 15 % (W/V)
0.3 AMPUL (EA) INHALATION CONTINUOUS PRN
Status: DISCONTINUED | OUTPATIENT
Start: 2025-06-18 | End: 2025-06-20 | Stop reason: HOSPADM

## 2025-06-18 RX ORDER — SIMETHICONE 80 MG
1 TABLET,CHEWABLE ORAL EVERY 6 HOURS PRN
Status: DISCONTINUED | OUTPATIENT
Start: 2025-06-18 | End: 2025-06-20 | Stop reason: HOSPADM

## 2025-06-18 RX ORDER — OXYTOCIN 10 [USP'U]/ML
10 INJECTION, SOLUTION INTRAMUSCULAR; INTRAVENOUS ONCE AS NEEDED
Status: DISCONTINUED | OUTPATIENT
Start: 2025-06-18 | End: 2025-06-20 | Stop reason: HOSPADM

## 2025-06-18 RX ORDER — OXYTOCIN-SODIUM CHLORIDE 0.9% IV SOLN 30 UNIT/500ML 30-0.9/5 UT/ML-%
95 SOLUTION INTRAVENOUS ONCE AS NEEDED
Status: DISCONTINUED | OUTPATIENT
Start: 2025-06-18 | End: 2025-06-20 | Stop reason: HOSPADM

## 2025-06-18 RX ORDER — MISOPROSTOL 200 UG/1
800 TABLET ORAL
Status: DISCONTINUED | OUTPATIENT
Start: 2025-06-18 | End: 2025-06-18

## 2025-06-18 RX ORDER — KETOROLAC TROMETHAMINE 30 MG/ML
INJECTION, SOLUTION INTRAMUSCULAR; INTRAVENOUS
Status: DISCONTINUED | OUTPATIENT
Start: 2025-06-18 | End: 2025-06-20

## 2025-06-18 RX ORDER — IBUPROFEN 800 MG/1
800 TABLET, FILM COATED ORAL EVERY 8 HOURS
Status: DISCONTINUED | OUTPATIENT
Start: 2025-06-20 | End: 2025-06-20 | Stop reason: HOSPADM

## 2025-06-18 RX ORDER — TRANEXAMIC ACID 10 MG/ML
1000 INJECTION, SOLUTION INTRAVENOUS EVERY 30 MIN PRN
Status: DISCONTINUED | OUTPATIENT
Start: 2025-06-18 | End: 2025-06-20 | Stop reason: HOSPADM

## 2025-06-18 RX ORDER — DEXAMETHASONE SODIUM PHOSPHATE 4 MG/ML
INJECTION, SOLUTION INTRA-ARTICULAR; INTRALESIONAL; INTRAMUSCULAR; INTRAVENOUS; SOFT TISSUE
Status: DISCONTINUED | OUTPATIENT
Start: 2025-06-18 | End: 2025-06-20

## 2025-06-18 RX ORDER — ENOXAPARIN SODIUM 100 MG/ML
40 INJECTION SUBCUTANEOUS EVERY 12 HOURS
Status: DISCONTINUED | OUTPATIENT
Start: 2025-06-19 | End: 2025-06-20 | Stop reason: HOSPADM

## 2025-06-18 RX ORDER — METHYLERGONOVINE MALEATE 0.2 MG/ML
200 INJECTION INTRAVENOUS
Status: DISCONTINUED | OUTPATIENT
Start: 2025-06-18 | End: 2025-06-18

## 2025-06-18 RX ORDER — CARBOPROST TROMETHAMINE 250 UG/ML
250 INJECTION, SOLUTION INTRAMUSCULAR
Status: DISCONTINUED | OUTPATIENT
Start: 2025-06-18 | End: 2025-06-20 | Stop reason: HOSPADM

## 2025-06-18 RX ORDER — SODIUM CITRATE AND CITRIC ACID MONOHYDRATE 334; 500 MG/5ML; MG/5ML
30 SOLUTION ORAL
Status: DISCONTINUED | OUTPATIENT
Start: 2025-06-18 | End: 2025-06-18

## 2025-06-18 RX ORDER — DIPHENHYDRAMINE HCL 25 MG
25 CAPSULE ORAL EVERY 4 HOURS PRN
Status: DISCONTINUED | OUTPATIENT
Start: 2025-06-18 | End: 2025-06-20 | Stop reason: HOSPADM

## 2025-06-18 RX ORDER — BISACODYL 10 MG/1
10 SUPPOSITORY RECTAL ONCE AS NEEDED
Status: DISCONTINUED | OUTPATIENT
Start: 2025-06-18 | End: 2025-06-20 | Stop reason: HOSPADM

## 2025-06-18 RX ORDER — OXYCODONE AND ACETAMINOPHEN 10; 325 MG/1; MG/1
1 TABLET ORAL EVERY 4 HOURS PRN
Status: DISCONTINUED | OUTPATIENT
Start: 2025-06-18 | End: 2025-06-20 | Stop reason: HOSPADM

## 2025-06-18 RX ORDER — IBUPROFEN 400 MG/1
800 TABLET, FILM COATED ORAL EVERY 8 HOURS
Status: DISCONTINUED | OUTPATIENT
Start: 2025-06-19 | End: 2025-06-18

## 2025-06-18 RX ORDER — OXYTOCIN-SODIUM CHLORIDE 0.9% IV SOLN 30 UNIT/500ML 30-0.9/5 UT/ML-%
95 SOLUTION INTRAVENOUS CONTINUOUS PRN
Status: DISCONTINUED | OUTPATIENT
Start: 2025-06-18 | End: 2025-06-20 | Stop reason: HOSPADM

## 2025-06-18 RX ORDER — KETOROLAC TROMETHAMINE 30 MG/ML
30 INJECTION, SOLUTION INTRAMUSCULAR; INTRAVENOUS EVERY 8 HOURS
Status: DISCONTINUED | OUTPATIENT
Start: 2025-06-18 | End: 2025-06-18

## 2025-06-18 RX ORDER — OXYCODONE AND ACETAMINOPHEN 5; 325 MG/1; MG/1
1 TABLET ORAL EVERY 4 HOURS PRN
Status: DISCONTINUED | OUTPATIENT
Start: 2025-06-18 | End: 2025-06-20 | Stop reason: HOSPADM

## 2025-06-18 RX ORDER — ONDANSETRON HYDROCHLORIDE 2 MG/ML
4 INJECTION, SOLUTION INTRAVENOUS EVERY 12 HOURS PRN
Status: DISCONTINUED | OUTPATIENT
Start: 2025-06-18 | End: 2025-06-20 | Stop reason: HOSPADM

## 2025-06-18 RX ORDER — PROMETHAZINE HYDROCHLORIDE 25 MG/1
25 SUPPOSITORY RECTAL EVERY 6 HOURS PRN
Status: DISCONTINUED | OUTPATIENT
Start: 2025-06-18 | End: 2025-06-20 | Stop reason: HOSPADM

## 2025-06-18 RX ORDER — CARBOPROST TROMETHAMINE 250 UG/ML
250 INJECTION, SOLUTION INTRAMUSCULAR
Status: DISCONTINUED | OUTPATIENT
Start: 2025-06-18 | End: 2025-06-18

## 2025-06-18 RX ORDER — DIPHENHYDRAMINE HYDROCHLORIDE 50 MG/ML
25 INJECTION, SOLUTION INTRAMUSCULAR; INTRAVENOUS EVERY 4 HOURS PRN
Status: DISCONTINUED | OUTPATIENT
Start: 2025-06-18 | End: 2025-06-20 | Stop reason: HOSPADM

## 2025-06-18 RX ORDER — ONDANSETRON HYDROCHLORIDE 2 MG/ML
INJECTION, SOLUTION INTRAVENOUS
Status: DISCONTINUED | OUTPATIENT
Start: 2025-06-18 | End: 2025-06-20

## 2025-06-18 RX ORDER — OXYTOCIN-SODIUM CHLORIDE 0.9% IV SOLN 30 UNIT/500ML 30-0.9/5 UT/ML-%
SOLUTION INTRAVENOUS CONTINUOUS PRN
Status: DISCONTINUED | OUTPATIENT
Start: 2025-06-18 | End: 2025-06-20

## 2025-06-18 RX ORDER — PRENATAL WITH FERROUS FUM AND FOLIC ACID 3080; 920; 120; 400; 22; 1.84; 3; 20; 10; 1; 12; 200; 27; 25; 2 [IU]/1; [IU]/1; MG/1; [IU]/1; MG/1; MG/1; MG/1; MG/1; MG/1; MG/1; UG/1; MG/1; MG/1; MG/1; MG/1
1 TABLET ORAL DAILY
Status: DISCONTINUED | OUTPATIENT
Start: 2025-06-19 | End: 2025-06-20 | Stop reason: HOSPADM

## 2025-06-18 RX ORDER — OXYTOCIN-SODIUM CHLORIDE 0.9% IV SOLN 30 UNIT/500ML 30-0.9/5 UT/ML-%
10 SOLUTION INTRAVENOUS ONCE AS NEEDED
Status: CANCELLED | OUTPATIENT
Start: 2025-06-18 | End: 2036-11-14

## 2025-06-18 RX ORDER — SODIUM CHLORIDE, SODIUM LACTATE, POTASSIUM CHLORIDE, CALCIUM CHLORIDE 600; 310; 30; 20 MG/100ML; MG/100ML; MG/100ML; MG/100ML
INJECTION, SOLUTION INTRAVENOUS CONTINUOUS
Status: DISCONTINUED | OUTPATIENT
Start: 2025-06-18 | End: 2025-06-18

## 2025-06-18 RX ORDER — ONDANSETRON 8 MG/1
8 TABLET, ORALLY DISINTEGRATING ORAL EVERY 8 HOURS PRN
Status: DISCONTINUED | OUTPATIENT
Start: 2025-06-18 | End: 2025-06-20 | Stop reason: HOSPADM

## 2025-06-18 RX ADMIN — ONDANSETRON 8 MG: 2 INJECTION INTRAMUSCULAR; INTRAVENOUS at 07:06

## 2025-06-18 RX ADMIN — DEXAMETHASONE SODIUM PHOSPHATE 4 MG: 4 INJECTION, SOLUTION INTRA-ARTICULAR; INTRALESIONAL; INTRAMUSCULAR; INTRAVENOUS; SOFT TISSUE at 07:06

## 2025-06-18 RX ADMIN — MORPHINE SULFATE 0.1 MG: 1 INJECTION, SOLUTION EPIDURAL; INTRATHECAL; INTRAVENOUS at 07:06

## 2025-06-18 RX ADMIN — BUPIVACAINE HYDROCHLORIDE 1.8 ML: 7.5 INJECTION, SOLUTION EPIDURAL; RETROBULBAR at 07:06

## 2025-06-18 RX ADMIN — PHENYLEPHRINE HYDROCHLORIDE 200 MCG: 10 INJECTION INTRAVENOUS at 08:06

## 2025-06-18 RX ADMIN — DEXMEDETOMIDINE 5 MCG: 200 INJECTION, SOLUTION INTRAVENOUS at 07:06

## 2025-06-18 RX ADMIN — SODIUM CHLORIDE, SODIUM LACTATE, POTASSIUM CHLORIDE, AND CALCIUM CHLORIDE: 600; 310; 30; 20 INJECTION, SOLUTION INTRAVENOUS at 07:06

## 2025-06-18 RX ADMIN — PHENYLEPHRINE HYDROCHLORIDE 200 MCG: 10 INJECTION INTRAVENOUS at 07:06

## 2025-06-18 RX ADMIN — EPHEDRINE SULFATE 10 MG: 50 INJECTION INTRAVENOUS at 09:06

## 2025-06-18 RX ADMIN — KETOROLAC TROMETHAMINE 30 MG: 30 INJECTION, SOLUTION INTRAMUSCULAR; INTRAVENOUS at 08:06

## 2025-06-18 RX ADMIN — Medication 30 UNITS: at 08:06

## 2025-06-18 RX ADMIN — Medication 334 ML/HR: at 08:06

## 2025-06-18 NOTE — PROGRESS NOTES
MATERNAL-FETAL MEDICINE   CONSULT NOTE    Provider requesting consultation: Urgent Add    SUBJECTIVE:     Ms. Jamaica Leonard is a 30 y.o.  female with IUP at 36w6d who is seen in consultation by MFM for evaluation and management of:  Problem   Vasa Previa     Patient has no complaints today. She is overall feeling well.  Patient denies any contractions/cramping, vaginal bleeding or leakage of fluid.  She reports good fetal movement.       Medication List with Changes/Refills   Current Medications    ASPIRIN (ECOTRIN) 81 MG EC TABLET    Take 1 tablet (81 mg total) by mouth once daily.    FAMOTIDINE (PEPCID) 40 MG TABLET    Take 1 tablet (40 mg total) by mouth 2 (two) times daily.    ONDANSETRON (ZOFRAN-ODT) 8 MG TBDL    Take 1 tablet (8 mg total) by mouth every 6 (six) hours as needed.    PRENATAL 25/IRON/FOLATE 6/DHA (PRENA1 ORAL)    Take by mouth.      Review of patient's allergies indicates:   Allergen Reactions    Vancomycin analogues Swelling       PMH: Past Medical History[1]   PObHx:  OB History    Para Term  AB Living   1 0 0 0 0 0   SAB IAB Ectopic Multiple Live Births   0 0 0 0 0      # Outcome Date GA Lbr James/2nd Weight Sex Type Anes PTL Lv   1 Current              PSH:Past Surgical History[2]     Family history:family history includes Dementia in her paternal grandmother; Diabetes in her mother.    Social history: reports that she has quit smoking. Her smoking use included vaping with nicotine. She has never used smokeless tobacco. She reports that she does not drink alcohol and does not use drugs.    Objective:   LMP 2024 (Exact Date)     Physical Exam  deferred    Ultrasound performed. See viewpoint for full ultrasound report.  Castro IUP with normal cardiac activity.   Transvaginal scan demonstrates a persistent low lying placenta.  There is a vessel that is overlying the internal os and coursing across. This is concerning for a vasa previa.    Significant  labs/imaging:  Aneuploidy screen:   Lab Results   Component Value Date    [id number] Negative 2024    T18 Negative 2024    [id number] Negative 2024    MONOSOMYXRES Not Detected 2024       ASSESSMENT/PLAN:     30 y.o.  female with IUP at 36w6d     Assessment & Plan  Vasa previa, single or unspecified fetus  Patient counseled regarding diagnosis of vasa previa. A vasa previa occurs when fetal vessels run through the fetal membranes, over or near the internal cervical os (2 cm or less) and are unprotected by placenta or umbilical cord. Vasa previa occurs in approximately 1 in 2,500 deliveries and risk factors for vasa previa include: velamentous cord insertion, succenturiate or bilobed placenta, placenta previa or low-lying placenta in second trimester, IVF pregnancy, multiple gestation. When detected by 2nd trimester ultrasound, 15 -20% of vasa previa will resolve by the third trimester. Fetal hemorrhage or exsanguination may occur with rupture of membranes and fetal asphyxia secondary to compression of vessels overlying the cervix may occur with labor. Outcomes appear to be improved with prenatal diagnosis of vasa previa (survival rate is 97% with prenatal diagnosis vs. 44% with  diagnosis).   In general, because events associated with adverse outcomes in the setting of vasa previa generally occur almost instantaneously, inpatient management has not been demonstrated to improve  outcomes or avoid catastrophic events. We reviewed this today. The [facility] does not routinely recommend inpatient hospitalization for all patients in whom a vasa previa has been diagnosed.      delivery should be scheduled by 36 weeks 0 days or once a diagnosis of unresolved vasa previa is made. Delayed cord clamping should NOT be performed. Neonatology should be notified of the diagnosis prior to delivery in order to make appropriate preparations in case fetal bleeding occurs.  I  communicated with Ms. Lopez who will assist wit scheduling patient in [place] with OB for delivery via .    Recommendations:  PTL/Bleeding precautions reviewed  Pelvic rest  Speculum exams only; avoid digital exams   delivery today or tomorrow pending availability.  Do not perform delayed cord clamping  Notify neonatology for delivery  Send placenta to pathology       Patient was counseled that prenatal ultrasound studies have limitations. They do not detect all fetal, genetic, placental, and maternal abnormalities.    FOLLOW UP:   No further ultrasounds or visits were scheduled.    The patient was given an opportunity to ask questions about the management of her high risk pregnancy problems. She expressed an understanding of and agreement to the above impression and plan. All questions were answered to her satisfaction.        Ismael Tellez MD   Maternal-Fetal Medicine      Electronically Signed by Ismael Tellez 2025       [1]   Past Medical History:  Diagnosis Date    ADHD     Insomnia    [2]   Past Surgical History:  Procedure Laterality Date    ADENOIDECTOMY      APPENDECTOMY      TONSILLECTOMY

## 2025-06-18 NOTE — SUBJECTIVE & OBJECTIVE
Obstetric HPI:  Patient reports None contractions, active fetal movement, No vaginal bleeding , No loss of fluid     This pregnancy has been complicated by Vasa previa, obesity    OB History    Para Term  AB Living   1 0 0 0 0 0   SAB IAB Ectopic Multiple Live Births   0 0 0 0 0      # Outcome Date GA Lbr James/2nd Weight Sex Type Anes PTL Lv   1 Current              Past Medical History:   Diagnosis Date    ADHD     Insomnia      Past Surgical History:   Procedure Laterality Date    ADENOIDECTOMY      APPENDECTOMY      TONSILLECTOMY         PTA Medications   Medication Sig    famotidine (PEPCID) 40 MG tablet Take 1 tablet (40 mg total) by mouth 2 (two) times daily.    ondansetron (ZOFRAN-ODT) 8 MG TbDL Take 1 tablet (8 mg total) by mouth every 6 (six) hours as needed. (Patient not taking: Reported on 2025)    prenatal 25/iron/folate 6/dha (PRENA1 ORAL) Take by mouth.       Review of patient's allergies indicates:   Allergen Reactions    Vancomycin analogues Swelling        Family History       Problem Relation (Age of Onset)    Dementia Paternal Grandmother    Diabetes Mother          Tobacco Use    Smoking status: Former     Types: Vaping with nicotine    Smokeless tobacco: Never   Substance and Sexual Activity    Alcohol use: No    Drug use: No    Sexual activity: Yes     Partners: Male     Review of Systems   Constitutional:  Negative for activity change, appetite change, chills, fatigue, fever and unexpected weight change.   Respiratory:  Negative for shortness of breath.    Cardiovascular:  Negative for chest pain, palpitations and leg swelling.   Gastrointestinal:  Negative for abdominal pain, bloating, blood in stool, constipation, diarrhea, nausea and vomiting.   Genitourinary:  Negative for dysuria, flank pain, frequency, genital sores, hematuria, pelvic pain, urgency, vaginal bleeding, vaginal discharge, vaginal pain, urinary incontinence, vaginal dryness and vaginal odor.    Musculoskeletal:  Negative for back pain.   Neurological:  Negative for syncope and headaches.      Objective:     Vital Signs (Most Recent):  Temp: 98 °F (36.7 °C) (06/18/25 1806)  Pulse: (!) 130 (06/18/25 1834)  Resp: 16 (06/18/25 1806)  BP: 123/83 (06/18/25 1834) Vital Signs (24h Range):  Temp:  [98 °F (36.7 °C)] 98 °F (36.7 °C)  Pulse:  [127-130] 130  Resp:  [16] 16  BP: (123-130)/(83-93) 123/83     Weight: (!) 138.5 kg (305 lb 3.6 oz)  Body mass index is 42.57 kg/m².    FHT: Cat 1 (reassuring)  TOCO:  No contractions     Physical Exam:   Constitutional: She is oriented to person, place, and time. She appears well-developed and well-nourished. No distress.    HENT:   Head: Normocephalic and atraumatic.    Eyes: Pupils are equal, round, and reactive to light. EOM are normal.     Cardiovascular:  Normal rate, regular rhythm and normal heart sounds.             Pulmonary/Chest: Effort normal and breath sounds normal.        Abdominal: Soft. Bowel sounds are normal. She exhibits no distension. There is no abdominal tenderness.             Musculoskeletal: Normal range of motion and moves all extremeties. No tenderness or edema.       Neurological: She is alert and oriented to person, place, and time.    Skin: Skin is warm and dry.    Psychiatric: She has a normal mood and affect. Her behavior is normal. Thought content normal.          Significant Labs:  Lab Results   Component Value Date    GROUPTRH O POS 11/21/2024    HEPBSAG Non-reactive 11/21/2024       I have personallly reviewed all pertinent lab results from the last 24 hours.

## 2025-06-18 NOTE — ASSESSMENT & PLAN NOTE
Patient counseled regarding diagnosis of vasa previa. A vasa previa occurs when fetal vessels run through the fetal membranes, over or near the internal cervical os (2 cm or less) and are unprotected by placenta or umbilical cord. Vasa previa occurs in approximately 1 in 2,500 deliveries and risk factors for vasa previa include: velamentous cord insertion, succenturiate or bilobed placenta, placenta previa or low-lying placenta in second trimester, IVF pregnancy, multiple gestation. When detected by 2nd trimester ultrasound, 15 -20% of vasa previa will resolve by the third trimester. Fetal hemorrhage or exsanguination may occur with rupture of membranes and fetal asphyxia secondary to compression of vessels overlying the cervix may occur with labor. Outcomes appear to be improved with prenatal diagnosis of vasa previa (survival rate is 97% with prenatal diagnosis vs. 44% with  diagnosis).   In general, because events associated with adverse outcomes in the setting of vasa previa generally occur almost instantaneously, inpatient management has not been demonstrated to improve  outcomes or avoid catastrophic events. We reviewed this today. [facility] does not routinely recommend inpatient hospitalization for all patients in whom a vasa previa has been diagnosed.      delivery should be scheduled by 36 weeks 0 days or once a diagnosis of unresolved vasa previa is made. Delayed cord clamping should NOT be performed. Neonatology should be notified of the diagnosis prior to delivery in order to make appropriate preparations in case fetal bleeding occurs.  I communicated with Ms. Lopez who will assist wit scheduling patient in [place] with OB for delivery via .    Recommendations:  PTL/Bleeding precautions reviewed  Pelvic rest  Speculum exams only; avoid digital exams   delivery today or tomorrow pending availability.  Do not perform delayed cord clamping  Notify neonatology for  delivery  Send placenta to pathology

## 2025-06-18 NOTE — H&P
[facility] - Labor & Delivery  Obstetrics  History & Physical    Patient Name: Jamaica Leonard  MRN: [id number]  Admission Date: 2025  Primary Care Provider: Renetta, Primary Doctor    Subjective:     Principal Problem:Vasa previa affecting labor and delivery in mo pregnancy    History of Present Illness:  29 yo G1 with IUP at 36w6d was sent from [facility] clinic for C/S delivery today secondary to newly diagnosed vasa previa without bleeding.  Pt reports no complaints.  Denies pain or bleeding.  Is ready to proceed with C/S.    Obstetric HPI:  Patient reports None contractions, active fetal movement, No vaginal bleeding , No loss of fluid     This pregnancy has been complicated by Vasa previa, obesity    OB History    Para Term  AB Living   1 0 0 0 0 0   SAB IAB Ectopic Multiple Live Births   0 0 0 0 0      # Outcome Date GA Lbr James/2nd Weight Sex Type Anes PTL Lv   1 Current              Past Medical History:   Diagnosis Date    ADHD     Insomnia      Past Surgical History:   Procedure Laterality Date    ADENOIDECTOMY      APPENDECTOMY      TONSILLECTOMY         PTA Medications   Medication Sig    famotidine (PEPCID) 40 MG tablet Take 1 tablet (40 mg total) by mouth 2 (two) times daily.    ondansetron (ZOFRAN-ODT) 8 MG TbDL Take 1 tablet (8 mg total) by mouth every 6 (six) hours as needed. (Patient not taking: Reported on 2025)    prenatal 25/iron/folate 6/dha (PRENA1 ORAL) Take by mouth.       Review of patient's allergies indicates:   Allergen Reactions    Vancomycin analogues Swelling        Family History       Problem Relation (Age of Onset)    Dementia Paternal Grandmother    Diabetes Mother          Tobacco Use    Smoking status: Former     Types: Vaping with nicotine    Smokeless tobacco: Never   Substance and Sexual Activity    Alcohol use: No    Drug use: No    Sexual activity: Yes     Partners: Male     Review of Systems   Constitutional:  Negative for activity change, appetite change,  chills, fatigue, fever and unexpected weight change.   Respiratory:  Negative for shortness of breath.    Cardiovascular:  Negative for chest pain, palpitations and leg swelling.   Gastrointestinal:  Negative for abdominal pain, bloating, blood in stool, constipation, diarrhea, nausea and vomiting.   Genitourinary:  Negative for dysuria, flank pain, frequency, genital sores, hematuria, pelvic pain, urgency, vaginal bleeding, vaginal discharge, vaginal pain, urinary incontinence, vaginal dryness and vaginal odor.   Musculoskeletal:  Negative for back pain.   Neurological:  Negative for syncope and headaches.      Objective:     Vital Signs (Most Recent):  Temp: 98 °F (36.7 °C) (06/18/25 1806)  Pulse: (!) 130 (06/18/25 1834)  Resp: 16 (06/18/25 1806)  BP: 123/83 (06/18/25 1834) Vital Signs (24h Range):  Temp:  [98 °F (36.7 °C)] 98 °F (36.7 °C)  Pulse:  [127-130] 130  Resp:  [16] 16  BP: (123-130)/(83-93) 123/83     Weight: (!) 138.5 kg (305 lb 3.6 oz)  Body mass index is 42.57 kg/m².    FHT: Cat 1 (reassuring)  TOCO:  No contractions     Physical Exam:   Constitutional: She is oriented to person, place, and time. She appears well-developed and well-nourished. No distress.    HENT:   Head: Normocephalic and atraumatic.    Eyes: Pupils are equal, round, and reactive to light. EOM are normal.     Cardiovascular:  Normal rate, regular rhythm and normal heart sounds.             Pulmonary/Chest: Effort normal and breath sounds normal.        Abdominal: Soft. Bowel sounds are normal. She exhibits no distension. There is no abdominal tenderness.             Musculoskeletal: Normal range of motion and moves all extremeties. No tenderness or edema.       Neurological: She is alert and oriented to person, place, and time.    Skin: Skin is warm and dry.    Psychiatric: She has a normal mood and affect. Her behavior is normal. Thought content normal.          Significant Labs:  Lab Results   Component Value Date    GROUPTRH O POS  2024    HEPBSAG Non-reactive 2024       I have personallly reviewed all pertinent lab results from the last 24 hours.  Assessment/Plan:     30 y.o. female  at 36w6d for:    * Vasa previa affecting labor and delivery in mo pregnancy  Admit for delivery secondary to vasa previa as per [facility] recommendations. Procedure details, indications, risks, benefits, and alternatives were reviewed with pt.  Pt voiced understanding and desires to proceed with C/S.  Hospital consents were reviewed with pt and signed.  Pre-operative instructions were given.      Obesity affecting pregnancy, antepartum  Lovenox PP.        Sushil Choudhary MD  Obstetrics  [facility] - Labor & Delivery

## 2025-06-18 NOTE — HPI
31 yo G1 with IUP at 36w6d was sent from [facility] clinic for C/S delivery today secondary to newly diagnosed vasa previa without bleeding.  Pt reports no complaints.  Denies pain or bleeding.  Is ready to proceed with C/S.

## 2025-06-19 LAB
GROUP B STREPTOCOCCUS, PCR (OHS): NEGATIVE
T PALLIDUM IGG+IGM SER QL: NORMAL

## 2025-06-19 PROCEDURE — 63600175 PHARM REV CODE 636 W HCPCS: Performed by: OBSTETRICS & GYNECOLOGY

## 2025-06-19 PROCEDURE — 25000003 PHARM REV CODE 250: Performed by: PHYSICIAN ASSISTANT

## 2025-06-19 PROCEDURE — 25000003 PHARM REV CODE 250: Performed by: OBSTETRICS & GYNECOLOGY

## 2025-06-19 PROCEDURE — 11000001 HC ACUTE MED/SURG PRIVATE ROOM

## 2025-06-19 RX ORDER — FAMOTIDINE 20 MG/1
40 TABLET, FILM COATED ORAL 2 TIMES DAILY
Status: DISCONTINUED | OUTPATIENT
Start: 2025-06-19 | End: 2025-06-20 | Stop reason: HOSPADM

## 2025-06-19 RX ADMIN — KETOROLAC TROMETHAMINE 30 MG: 30 INJECTION, SOLUTION INTRAMUSCULAR; INTRAVENOUS at 02:06

## 2025-06-19 RX ADMIN — FAMOTIDINE 40 MG: 20 TABLET, FILM COATED ORAL at 08:06

## 2025-06-19 RX ADMIN — FAMOTIDINE 40 MG: 20 TABLET, FILM COATED ORAL at 10:06

## 2025-06-19 RX ADMIN — DOCUSATE SODIUM 100 MG: 100 CAPSULE, LIQUID FILLED ORAL at 08:06

## 2025-06-19 RX ADMIN — KETOROLAC TROMETHAMINE 30 MG: 30 INJECTION, SOLUTION INTRAMUSCULAR; INTRAVENOUS at 04:06

## 2025-06-19 RX ADMIN — ENOXAPARIN SODIUM 40 MG: 40 INJECTION SUBCUTANEOUS at 08:06

## 2025-06-19 RX ADMIN — PRENATAL VITAMINS-IRON FUMARATE 27 MG IRON-FOLIC ACID 0.8 MG TABLET 1 TABLET: at 08:06

## 2025-06-19 RX ADMIN — KETOROLAC TROMETHAMINE 30 MG: 30 INJECTION, SOLUTION INTRAMUSCULAR; INTRAVENOUS at 10:06

## 2025-06-19 NOTE — PLAN OF CARE
Patient afebrile and had no falls this shift. Fundus firm without massage and below umbilicus. Bleeding light, no clots passed this shift. Voids spontaneously. Ambulates independently. Pain well controlled with IV pain medication. Vital signs stable at this time. Bonding well with infant; responds to infant cues and participates in infant care.     Problem: Postpartum ( Delivery)  Goal: Successful Parent Role Transition  Outcome: Progressing  Goal: Hemostasis  Outcome: Progressing  Goal: Effective Bowel Elimination  Outcome: Progressing  Goal: Fluid and Electrolyte Balance  Outcome: Progressing  Goal: Absence of Infection Signs and Symptoms  Outcome: Progressing  Goal: Anesthesia/Sedation Recovery  Outcome: Progressing  Goal: Optimal Pain Control and Function  Outcome: Progressing  Goal: Nausea and Vomiting Relief  Outcome: Progressing  Goal: Effective Urinary Elimination  Outcome: Progressing  Goal: Effective Oxygenation and Ventilation  Outcome: Progressing     Problem: Wound  Goal: Optimal Coping  Outcome: Progressing  Goal: Optimal Functional Ability  Outcome: Progressing  Goal: Absence of Infection Signs and Symptoms  Outcome: Progressing  Goal: Improved Oral Intake  Outcome: Progressing  Goal: Optimal Pain Control and Function  Outcome: Progressing  Goal: Skin Health and Integrity  Outcome: Progressing  Goal: Optimal Wound Healing  Outcome: Progressing

## 2025-06-19 NOTE — ANESTHESIA PROCEDURE NOTES
Spinal    Diagnosis: IUP primary CS  Patient location during procedure: OR  Start time: 6/18/2025 7:39 PM  Timeout: 6/18/2025 7:39 PM  End time: 6/18/2025 7:43 PM    Staffing  Authorizing Provider: Chriss Greco CRNA  Performing Provider: Chriss Greco CRNA    Staffing  Performed by: Chriss Greco CRNA  Authorized by: Chriss Greco CRNA    Preanesthetic Checklist  Completed: patient identified, IV checked, site marked, risks and benefits discussed, surgical consent, monitors and equipment checked, pre-op evaluation and timeout performed  Spinal Block  Patient position: sitting  Prep: ChloraPrep  Patient monitoring: heart rate, cardiac monitor and continuous pulse ox  Approach: midline  Location: L3-4  Injection technique: single shot  CSF Fluid: clear free-flowing CSF  Needle  Needle type: Sinai   Needle gauge: 25 G  Needle length: 3.5 in  Additional Documentation: no paresthesia on injection and negative aspiration for heme  Needle localization: anatomical landmarks  Assessment  Sensory level: T4   Dermatomal levels determined by pinch or prick  Ease of block: easy  Patient's tolerance of the procedure: comfortable throughout block and no complaints

## 2025-06-19 NOTE — PLAN OF CARE
Patient afebrile and had no falls this shift. Fundus firm without massage and below umbilicus. Bleeding light, no clots passed this shift. Voids spontaneously. Ambulates independently. Pain well controlled with scheduled Toradol. Vital signs stable at this time. Bonding well with infant; responds to infant cues and participates in infant care. Will continue to monitor.

## 2025-06-19 NOTE — PLAN OF CARE
Mom and baby doing fine. Monitor blood pressure due to lower diastolic numbers. Have given fluid bolus and 10 MG of epinephren

## 2025-06-19 NOTE — SUBJECTIVE & OBJECTIVE
Interval History: POD 1 s/p .     She is doing well this morning. She is tolerating a regular diet without nausea or vomiting. She is not voiding spontaneously. She is not ambulating. Mackey in place; to be removed later this morning. She has not passed flatus, and has not a BM. Vaginal bleeding is mild. She denies fever or chills. Abdominal pain is mild and controlled with oral medications. She Is breastfeeding.     Objective:     Vital Signs (Most Recent):  Temp: 98.6 °F (37 °C) (25)  Pulse: 71 (25)  Resp: 16 (25)  BP: (!) 104/55 (25)  SpO2: (!) 94 % (25) Vital Signs (24h Range):  Temp:  [98 °F (36.7 °C)-98.7 °F (37.1 °C)] 98.6 °F (37 °C)  Pulse:  [] 71  Resp:  [16-18] 16  SpO2:  [89 %-99 %] 94 %  BP: ()/(35-93) 104/55     Weight: (!) 138.5 kg (305 lb 3.6 oz)  Body mass index is 42.57 kg/m².      Intake/Output Summary (Last 24 hours) at 2025 0848  Last data filed at 2025 2148  Gross per 24 hour   Intake --   Output 520 ml   Net -520 ml         Significant Labs:  Lab Results   Component Value Date    GROUPTRH O POS 2025    HEPBSAG Non-reactive 2024     Recent Labs   Lab 25  1820   HGB 12.4   HCT 36.8*       I have personallly reviewed all pertinent lab results from the last 24 hours.  Recent Lab Results         25  1909   25  1820        Albumin   3.1       ALP   97       ALT   9       Anion Gap   11       AST   11       Baso #   0.04       Basophil %   0.3       BILIRUBIN TOTAL   0.2  Comment: For infants and newborns, interpretation of results should be based   on gestational age, weight and in agreement with clinical   observations.    Premature Infant recommended reference ranges:   0-24 hours:  <8.0 mg/dL   24-48 hours: <12.0 mg/dL   3-5 days:    <15.0 mg/dL   6-29 days:   <15.0 mg/dL       BUN   10       Calcium   9.3       Chloride   109       CO2   18       Creatinine   0.6       eGFR    >60  Comment: Estimated GFR calculated using the CKD-EPI creatinine (2021) equation.       Eos #   0.18       Eos %   1.6       Glucose   73       Gran # (ANC)   8.78       Group & Rh O POS         Hematocrit   36.8       Hemoglobin   12.4       HIV 1/2 Ag/Ab   Negative       Immature Grans (Abs)   0.04  Comment: Mild elevation in immature granulocytes is non specific and can be seen in a variety of conditions including stress response, acute inflammation, trauma and pregnancy. Correlation with other laboratory and clinical findings is essential.       Immature Granulocytes   0.3       INDIRECT MARTINEZ NEG         Lymph #   1.43       Lymph %   12.4       MCH   28.1       MCHC   33.7       MCV   83       Mono #   1.10       Mono %   9.5       MPV   10.7       Neut %   75.9       nRBC   0       Platelet Count   276       Potassium   4.0       PROTEIN TOTAL   6.9       RBC   4.41       RDW   13.0       Sodium   138       Specimen Outdate 06/21/2025 23:59         Treponema Pallidum Antibodies (IgG, IgM)   Non-Reactive  Comment: RPR and/or RPR Titer to follow on all reactive results.       WBC   11.57               Physical Exam:   Constitutional: She is oriented to person, place, and time. She appears well-developed. No distress.    HENT:   Head: Normocephalic and atraumatic.    Eyes: Conjunctivae are normal. Right eye exhibits no discharge. Left eye exhibits no discharge. No scleral icterus.      Pulmonary/Chest: Effort normal. No respiratory distress.        Abdominal: Soft. She exhibits abdominal incision (Pfannenstiel incision with acquacel dressing in place, CDI. Appropriately TTP.). She exhibits no distension.   Fundus firm and below the level of the umbilicus     Genitourinary:    Genitourinary Comments: Mackey in place             Musculoskeletal: Normal range of motion. No edema.       Neurological: She is alert and oriented to person, place, and time.    Skin: Skin is warm and dry. She is not diaphoretic.

## 2025-06-19 NOTE — L&D DELIVERY NOTE
[facility] - Labor & Delivery   Section   Operative Note    SUMMARY     Date of Procedure: 2025     PRE-PROCEDURE COUNSELING:  Patient counseled on the risks, benefits, and alternatives to procedure.  Please see preoperative consents.   SCDs were applied and working prior to anesthesia induction.    PRE-OPERATIVE DIAGNOSIS:    IUP at 36w6d  Vasa Previa  Obesity    POST-OPERATIVE DIAGNOSIS: same    PROCEDURE: Primary Low Transverse  Section    SURGEON: Sushil Choudhary MD    ASSISTANT:  Regine Espinoza SCT    ANESTHESIA: Spinal Anesthesia    FINDINGS:  Single live female infant in cephalic presentation.  APGAR 8/9 Weight 6 lb 15 oz.  Normal uterus, tubes, ovaries.  Loose nuchal cord x 1.  Eccentric cord insertion with true umbilical cord knot x 1.    SPECIMEN:  None    EBL:  500 mL    COMPLICATIONS:  None    IMPLANTS:  None    PROCEDURE IN DETAIL:   The patient was seen in the Holding Room. The risks, benefits and alternatives were discussed. The patient agrees with the proposed plan, giving informed consent.  The patient was taken to Operating Room, identified as Jamaica Leonard and the procedure verified as  Delivery. A Time Out was held and the above information confirmed.    Epidural anesthesia was dosed, adequate level confirmed, and preoperative antibiotics administered.  The patient was draped and prepped in the usual sterile fashion.  A Pfannenstiel skin incision was made and carried down through the subcutaneous tissue to the fascia. Fascial incision was then made and extended transversely. The fascia was  from the underlying rectus muscles superiorly and inferiorly. The peritoneum was identified, tented up, and entered sharply.  This incision was then extended superiorly and inferiorly with good visualization of the underlying bowel and bladder.     The utero-vesical peritoneal reflection was incised transversely and the bladder flap was bluntly dissected from the lower  uterine segment.  A low transverse uterine incision was made in the midline and extended laterally.  There was clear amniotic fluid noted. The female infant was delivered from vertex presentation without difficulty and with Apgar scores of 8/9.  Loose nuchal cord x 1 was reduced.  The umbilical cord was doubly clamped and cut.  Cord blood was obtained. The placenta was removed intact and appeared normal.  The uterine incision was then approximated in a running locked fashion with 0-Chromic.     The abdomen and pelvis were irrigated and hemostasis noted.  The rectus muscles were then loosely approximated at the midline with 2-0 Chromic.  The fascia was then approximated in a running fashion with 0-Vicryl.  Wound was irrigated and hemostasis noted.  Subcutaneous fat layer was approximated with 2-0 Plain Gut in a running fashion.  The skin was approximated with 4-0 Monocryl in a running subcuticular fashion and an abdominal silver dressing placed.  Instrument, sponge, and needle counts were correct prior the abdominal closure and at the conclusion of the case.     DISPOSITION: to recovery PP room           CONDITION: stable           Specimens:   Specimen (24h ago, onward)      None            Condition: Stable    VTE Risk Mitigation (From admission, onward)           Ordered     enoxaparin injection 40 mg  Every 12 hours         25     IP VTE HIGH RISK PATIENT  Once         25     Place sequential compression device  Until discontinued         25                    Disposition: PACU - hemodynamically stable.    Attestation: Stable         Delivery Information for Chelsi Leonard    Birth information:  YOB: 2027   Time of birth: 8:05 PM   Sex: female   Head Delivery Date/Time: 2025  8:05 PM   Delivery type: , Low Transverse   Gestational Age: 36w6d       Delivery Providers    Delivering clinician: Sushil Choudhary MD   Provider Role    Whitney Clayton RN  Registered Nurse    Carlie Gentile, RN Registered Nurse    Maribell Gooden ST Registered Nurse    Nicole Billingsley, RN Registered Nurse    Regine Espinoza,  First Assist    Chriss Greco, CRNA Nurse Anesthetist              Measurements    Weight:   Length:          Apgars    Living status: Living  Apgar Component Scores:  1 min.:  5 min.:  10 min.:  15 min.:  20 min.:    Skin color:  1  1       Heart rate:  1  2       Reflex irritability:  2  2       Muscle tone:  2  2       Respiratory effort:  2  2       Total:  8  9       Apgars assigned by: NICOLE BILLINGSLEY         Operative Delivery    Forceps attempted?: No  Vacuum extractor attempted?: No         Shoulder Dystocia    Shoulder dystocia present?: No           Presentation    Presentation: Vertex           Interventions/Resuscitation    Method: Bulb Suctioning, Tactile Stimulation       Cord    Vessels: 3 vessels  Complications: Nuchal, Knot  Nuchal Intervention: reduced  Nuchal Cord Description: loose nuchal cord  Number of Loops: 1  Delayed Cord Clamping?: No  Cord Clamped Date/Time: 2025  8:06 PM  Cord Blood Disposition: Lab  Gases Sent?: No  Stem Cell Collection (by MD): No       Placenta    Placenta delivery date/time: 2025 20:06  Placenta removal: Manual removal  Placenta appearance: Intact  Placenta disposition: Pathology           Labor Events:       labor:       Labor Onset Date/Time:         Dilation Complete Date/Time:         Start Pushing Date/Time:         Start Pushing Date/Time:       Rupture Date/Time:            Rupture type:          Fluid Amount:       Fluid Color:                steroids:       Antibiotics given for GBS:       Induction:       Indications for induction:        Augmentation:       Indications for augmentation:       Labor complications:       Additional complications:          Cervical ripening:                     Delivery:      Episiotomy:       Indication for Episiotomy:       Perineal  Lacerations:   Repaired:      Periurethral Laceration:   Repaired:     Labial Laceration:   Repaired:     Sulcus Laceration:   Repaired:     Vaginal Laceration:   Repaired:     Cervical Laceration:   Repaired:     Repair suture:       Repair # of packets:       Last Value - EBL - Nursing (mL):       Sum - EBL - Nursing (mL): 0     Last Value - EBL - Anesthesia (mL):      Calculated QBL (mL):       Running total QBL (mL):       Vaginal Sweep Performed: Yes     Surgicount Correct: Yes     Vaginal Packing: No Quantity:       Other providers:       Anesthesia    Method: Spinal          Details (if applicable):  Trial of Labor No    Categorization: Primary    Priority: Routine   Indications for : Placenta Previa   Incision Type: low transverse     Additional  information:  Forceps:    Vacuum:    Breech:    Observed anomalies    Other (Comments):

## 2025-06-19 NOTE — LACTATION NOTE
This note was copied from a baby's chart.  Discussed practices that support optimal maternity care and  feeding such as immediate skin to skin, the magic first hour, the importance of the first feeding, and delaying routine procedures. Also discussed continued skin to skin contact, rooming-in, and feeding on cue. Discussed feeding choice with mother. Reviewed benefits of breastfeeding and risks of formula feeding. Mother states her intention is to breastfeed.

## 2025-06-19 NOTE — ASSESSMENT & PLAN NOTE
Admit for delivery secondary to vasa previa as per [facility] recommendations. Procedure details, indications, risks, benefits, and alternatives were reviewed with pt.  Pt voiced understanding and desires to proceed with C/S.  Hospital consents were reviewed with pt and signed.  Pre-operative instructions were given.

## 2025-06-19 NOTE — LACTATION NOTE
Lactation rounding attempted, mother is sleepy at this time. Nurse to call for assistance when mother is awake and ready to be seen.

## 2025-06-19 NOTE — PLAN OF CARE
[facility] - [facility] (Hospital)  Discharge Assessment    Primary Care Provider: No, Primary Doctor     OB Screen (most recent)       OB Screen - 25 [id number]          OB SCREEN    Assessment Type Discharge Planning Assessment     Source of Information patient;health record     Received Prenatal Care Yes     Any indications/suspicions for None     Is this a teen pregnancy No     Is the baby in NICU No     Indication for adoption/Safe Haven No     Indication for DME/post-acute needs No     HIV (+) No     Any congenital  disorders No     Fetal demise/ death No

## 2025-06-19 NOTE — PLAN OF CARE
Mom and baby had a safe delivery. Baby delivered at 2005 on 6/18/25. Mom had a vasal placenta previa.

## 2025-06-19 NOTE — LACTATION NOTE
Lactation Rounds:  Mother reports that she desires to feed her infant by latching or pumping. Mother states that her desire is to feed infant breast milk exclusively in the future. Mother also reports that she has been able to latch baby but is unsure if she is hearing swallows. Mother has infant skin to skin and is attempting to feed infant at this time.    Breast: right and left      Position [] cross cradle [] cradle [x] football [] laid-back   Gape [] narrow [x] adequate [] wide []    Latch [] shallow [] moderate [x] deep [] difficulty finding nipple    [x] successful []unsuccessful [x] required intervention [] full assist   Lip flange [x] top flanged [] bottom flanged [] top tucked [] bottom tucked   Oral seal [x] adequate [] poor []    Cheeks [x] round [] dimpled [] broken cheek line [] flat   Jaw [] piston [x] rocker [] chomping [] fasciculations   Maternal pain [x] none [] mild [] moderate [] severe   Swallow [] visible [x] audible [] gulping []    Swallow rate [] 2:1 [] high suck to swallow [] frequent pauses [x]variable   Difficulties [] milk leaking [] choking/coughing [] arching [] unsustained tongue extension    [] clicking [] crease above upper lip [] lip blanching [] fatigue     [] labored breathing [] nasal flaring [] stridor [] increased work of breathing    [] pop-offs [] vasospasm []   Minutes fed: 10 minutes both breasts     Maternal nipple shape  [x] WNL [] lipstick [] compressed [] white line   Baby after feeding [] content [] sleepy [] showing feeding cues [] alert    []fatigued [] fussy []     Mother reports that she has been supplementing infant with formula in a syringe after nursing due to low blood glucose levels of the infant. Mother desires to supplement infant with formula, but does not desire to feed infant with a syringe. Discussed feeding options. Mother desires to feed infant with a bottle and nipple. Nipple provided and education provided on paced bottle feeding.     Because baby  is being supplemented away from the breast, mother was:   - informed that breastfeeding support and assistance is available as needed  - encouraged to express milk from both breasts each time a supplement is given  - encouraged to use her own collected milk as a first choice for supplementation  Mother was encouraged to request assistance as needed and voices understanding.     [employer] breast pump set up at bedside.  Instructed on proper usage and to adjust suction according to comfort level. Verified appropriate flange fit- 18 mm bilaterally. Reviewed frequency and duration of pumping in order to promote and maintain full milk supply. Hands-on pumping technique reviewed. Encouraged hand expression after. Instructed on proper cleaning of breast pump parts. Reviewed proper milk handling, collection, storage, and transportation. Voices understanding.     Discussed early feeding cues and encouraged mother to feed baby in response to those cues. Encouraged on demand feedings and skin to skin.  Reviewed normal feeding expectations of 8 or more feedings per 24 hour period, cues that babies use to signal hunger and satiety and cluster feeding. Discussed the adequacy of colostrum and baby belly size for the first 3 days of life along with expected output.     Discussed risks of introducing a pacifier or artificial nipple and discussed the AAP recommendation to avoid the use of pacifiers until 1 month of age for breastfeeding infants. Mother reports that she receive a spectra breast pump through her insurance provider for home usage.    Mother states understanding and verbalized appropriate recall. Encouraged mother to call for assistance when desired, contact number provided, mother verbalizes understanding.

## 2025-06-19 NOTE — PROGRESS NOTES
[facility] ([facility])  Obstetrics  Postpartum Progress Note    Patient Name: Jamaica Leonard  MRN: [id number]  Admission Date: 2025  Hospital Length of Stay: 1 days  Attending Physician: Sushil Choudhary MD  Primary Care Provider: Renetta, Primary Doctor    Subjective:     Principal Problem:Status post primary low transverse  section    Hospital Course:  Underwent  on 25. Transferred to [facility] for routine post-op care.     Interval History: POD 1 s/p .     She is doing well this morning. She is tolerating a regular diet without nausea or vomiting. She is not voiding spontaneously. She is not ambulating. Mackey in place; to be removed later this morning. She has not passed flatus, and has not a BM. Vaginal bleeding is mild. She denies fever or chills. Abdominal pain is mild and controlled with oral medications. She Is breastfeeding.     Objective:     Vital Signs (Most Recent):  Temp: 98.6 °F (37 °C) (25 035)  Pulse: 71 (25)  Resp: 16 (25)  BP: (!) 104/55 (25)  SpO2: (!) 94 % (25) Vital Signs (24h Range):  Temp:  [98 °F (36.7 °C)-98.7 °F (37.1 °C)] 98.6 °F (37 °C)  Pulse:  [] 71  Resp:  [16-18] 16  SpO2:  [89 %-99 %] 94 %  BP: ()/(35-93) 104/55     Weight: (!) 138.5 kg (305 lb 3.6 oz)  Body mass index is 42.57 kg/m².      Intake/Output Summary (Last 24 hours) at 2025 0848  Last data filed at 2025 2148  Gross per 24 hour   Intake --   Output 520 ml   Net -520 ml         Significant Labs:  Lab Results   Component Value Date    GROUPTRH O POS 2025    HEPBSAG Non-reactive 2024     Recent Labs   Lab 25  1820   HGB 12.4   HCT 36.8*       I have personallly reviewed all pertinent lab results from the last 24 hours.  Recent Lab Results         25  1909   25  1820        Albumin   3.1       ALP   97       ALT   9       Anion Gap   11       AST   11       Baso #   0.04       Basophil %    0.3       BILIRUBIN TOTAL   0.2  Comment: For infants and newborns, interpretation of results should be based   on gestational age, weight and in agreement with clinical   observations.    Premature Infant recommended reference ranges:   0-24 hours:  <8.0 mg/dL   24-48 hours: <12.0 mg/dL   3-5 days:    <15.0 mg/dL   6-29 days:   <15.0 mg/dL       BUN   10       Calcium   9.3       Chloride   109       CO2   18       Creatinine   0.6       eGFR   >60  Comment: Estimated GFR calculated using the CKD-EPI creatinine (2021) equation.       Eos #   0.18       Eos %   1.6       Glucose   73       Gran # (ANC)   8.78       Group & Rh O POS         Hematocrit   36.8       Hemoglobin   12.4       HIV 1/2 Ag/Ab   Negative       Immature Grans (Abs)   0.04  Comment: Mild elevation in immature granulocytes is non specific and can be seen in a variety of conditions including stress response, acute inflammation, trauma and pregnancy. Correlation with other laboratory and clinical findings is essential.       Immature Granulocytes   0.3       INDIRECT MARTINEZ NEG         Lymph #   1.43       Lymph %   12.4       MCH   28.1       MCHC   33.7       MCV   83       Mono #   1.10       Mono %   9.5       MPV   10.7       Neut %   75.9       nRBC   0       Platelet Count   276       Potassium   4.0       PROTEIN TOTAL   6.9       RBC   4.41       RDW   13.0       Sodium   138       Specimen Outdate 06/21/2025 23:59         Treponema Pallidum Antibodies (IgG, IgM)   Non-Reactive  Comment: RPR and/or RPR Titer to follow on all reactive results.       WBC   11.57               Physical Exam:   Constitutional: She is oriented to person, place, and time. She appears well-developed. No distress.    HENT:   Head: Normocephalic and atraumatic.    Eyes: Conjunctivae are normal. Right eye exhibits no discharge. Left eye exhibits no discharge. No scleral icterus.      Pulmonary/Chest: Effort normal. No respiratory distress.        Abdominal: Soft.  She exhibits abdominal incision (Pfannenstiel incision with acquacel dressing in place, CDI. Appropriately TTP.). She exhibits no distension.   Fundus firm and below the level of the umbilicus     Genitourinary:    Genitourinary Comments: Mackey in place             Musculoskeletal: Normal range of motion. No edema.       Neurological: She is alert and oriented to person, place, and time.    Skin: Skin is warm and dry. She is not diaphoretic.          Assessment/Plan:     30 y.o. female  for:    * Status post primary low transverse  section  Underwent  on 25.  - POD *  - Tolerating PO  - Pain managed on current regimen  - Mackey to be removed this AM  - Doing well      Obesity affecting pregnancy, antepartum  - Lovenox PP while inpatient        Disposition: As patient meets milestones, will plan to discharge accordingly.    Sonia Saez PA-C  Obstetrics  [facility] ([facility])

## 2025-06-19 NOTE — ASSESSMENT & PLAN NOTE
Underwent  on 25.  - POD *  - Tolerating PO  - Pain managed on current regimen  - Mackey to be removed this AM  - Doing well

## 2025-06-20 VITALS
DIASTOLIC BLOOD PRESSURE: 71 MMHG | BODY MASS INDEX: 41.02 KG/M2 | WEIGHT: 293 LBS | OXYGEN SATURATION: 97 % | HEIGHT: 71 IN | RESPIRATION RATE: 18 BRPM | TEMPERATURE: 98 F | SYSTOLIC BLOOD PRESSURE: 119 MMHG | HEART RATE: 78 BPM

## 2025-06-20 PROCEDURE — 25000003 PHARM REV CODE 250: Performed by: OBSTETRICS & GYNECOLOGY

## 2025-06-20 PROCEDURE — 99238 HOSP IP/OBS DSCHRG MGMT 30/<: CPT | Mod: ,,, | Performed by: OBSTETRICS & GYNECOLOGY

## 2025-06-20 PROCEDURE — 25000003 PHARM REV CODE 250: Performed by: PHYSICIAN ASSISTANT

## 2025-06-20 RX ORDER — OXYCODONE AND ACETAMINOPHEN 10; 325 MG/1; MG/1
1 TABLET ORAL EVERY 6 HOURS PRN
Qty: 15 TABLET | Refills: 0 | Status: SHIPPED | OUTPATIENT
Start: 2025-06-20 | End: 2025-06-25

## 2025-06-20 RX ORDER — IBUPROFEN 800 MG/1
800 TABLET, FILM COATED ORAL EVERY 8 HOURS
Qty: 30 TABLET | Refills: 0 | Status: SHIPPED | OUTPATIENT
Start: 2025-06-20

## 2025-06-20 RX ORDER — AMOXICILLIN 250 MG
1 CAPSULE ORAL NIGHTLY PRN
Qty: 30 TABLET | Refills: 0 | Status: SHIPPED | OUTPATIENT
Start: 2025-06-20

## 2025-06-20 RX ADMIN — FAMOTIDINE 40 MG: 20 TABLET, FILM COATED ORAL at 08:06

## 2025-06-20 RX ADMIN — PRENATAL VITAMINS-IRON FUMARATE 27 MG IRON-FOLIC ACID 0.8 MG TABLET 1 TABLET: at 08:06

## 2025-06-20 RX ADMIN — DOCUSATE SODIUM 100 MG: 100 CAPSULE, LIQUID FILLED ORAL at 08:06

## 2025-06-20 RX ADMIN — IBUPROFEN 800 MG: 800 TABLET, FILM COATED ORAL at 06:06

## 2025-06-20 NOTE — DISCHARGE INSTRUCTIONS
"Mother Self Care:    Activity: Avoid strenuous exercise and get adequate rest.  No driving until the physician consent given.  Emotional Changes: Most women find birth to be a time of great emotional upheaval.  Sense of loss, mood swings, fatigue, anxiety, and feeling "let down" are common.  If feelings worsen or last more than a week, call your physician.  Breast Care/Breastfeeding: Wear a bra for comfort.  Keep nipples dry and apply your own breast milk or lanolin cream as needed for soreness.  Engorgement can be relieved with warm, moist heat before feedings.  You may also take Ibuprofen.  Breast Care/Bottle Feeding: Wear support bra 24 hours a day for one week.  Avoid stimulation to breasts.  You may use ice packs for discomfort.  Javy-Care/Vaginal Bleeding: Remember to use your javy-bottle after urinating.  Your flow will change from red, to pink, to yellow/white color over a period of 2 weeks.  Menstruation will return in 3-8 weeks, or longer if breastfeeding.   Section/Tubal Ligation: Keep incision clean and dry. You may shower, but avoid baths. Please continue to use Nozin twice a day for 7 days after surgery. Ensure you attend your 1 week post op visit to have your dressing removed. Use antibacterial soap to wash your entire body until directed otherwise by a Physician, it is very important to shower daily. If you become concerned about the way your incision site looks, please contact your providers office.   Sexual Activity/Pelvic Rest: No sexual activity, tampons, or douching until your physician gives you consent.  Diet: Continue to eat from the five basic food groups, including plenty of protein, fruits, vegetables, and whole grains.  Limit empty calories and high fat foods.  Drink enough fluids to satisfy thirst and add an extra 500 calories for breastfeeding.  Constipation/Hemorrhoids: Drink plenty of water.  You may take a stool softener or natural laxative (Metamucil). You may use tucks or " "hemorrhoid ointment and soak in a warm tub.    "What does help look like to you when you go home?"  "Is there any need that you anticipate that we may be able to assist with?"    CALL YOUR OB DOCTOR IF ANY OF THE FOLLOWING OCCURS:  *Heavy bleeding - saturating a pad an hour or passing any large (2-3 inches in size) blood clots.  *Any pain, redness, or tenderness in lower leg.  *You cannot care for yourself or your baby.  *Any signs of infection-      - Temperature greater than 100.5 degrees F      - Foul smelling vaginal discharge and/or incisional drainage      - Increased episiotomy or incisional pain      - Hot, hard, red or sore area on breast      - Flu-like symptoms      - Any urgency, frequency or burning with urination    Return To the Hospital for further Evaluation:  Headache not relieved by tylenol or ibuprofen  Blurry vision, double vision, seeing spots, or flashing lights  Feeling faint or passing out  Right epigastric pain  Difficulty breathing  Swelling in hands, face, or feet  Any of these symptoms accompanied by nausea/vomiting  Gaining more than 5 pounds in one week  Seizures  These symptoms could be an indication of elevated blood pressure.     For patients that were treated for high blood pressure during pregnancy and or your hospital stay you will need a blood pressure check three days after you leave the hospital. Your nursing staff will assist you in an appointment if needed. If you have Connected Mom you may use your blood pressure cuff and report any readings 140/90 to your provider immediately.       If you have any questions that need to be answered immediately please call the Labor & Delivery Unit at [phone] and ask to speak to a nurse.      Please see [facility] BLUE folder for additional information and handouts.        "

## 2025-06-20 NOTE — ANESTHESIA POSTPROCEDURE EVALUATION
Anesthesia Post Evaluation    Patient: Jamaica Leonard    Procedure(s) Performed: Procedure(s) (LRB):   SECTION (N/A)    Final Anesthesia Type: spinal      Patient location during evaluation: labor & delivery  Patient participation: Yes- Able to Participate  Level of consciousness: awake and alert  Post-procedure vital signs: reviewed and stable  Pain management: adequate  Airway patency: patent    PONV status at discharge: No PONV  Anesthetic complications: no      Cardiovascular status: blood pressure returned to baseline  Respiratory status: unassisted and spontaneous ventilation  Hydration status: euvolemic  Follow-up not needed.              Vitals Value Taken Time   /71 25 07:41   Temp 36.4 °C (97.6 °F) 25 07:41   Pulse 78 25 07:41   Resp 18 25 00:36   SpO2 97 % 25 07:41         Event Time   Out of Recovery 22:45:00         Pain/Malinda Score: Pain Rating Prior to Med Admin: 4 (2025  6:14 AM)  Pain Rating Post Med Admin: 4 (2025 10:46 PM)

## 2025-06-20 NOTE — PROGRESS NOTES
[facility] ([facility])  Obstetrics  Postpartum Progress Note    Patient Name: Jamaica Leonard  MRN: [id number]  Admission Date: 2025  Hospital Length of Stay: 2 days  Attending Physician: Sushil Choudhary MD  Primary Care Provider: Renetta, Primary Doctor    Subjective:     Principal Problem:Status post primary low transverse  section    Hospital Course:  Underwent  on 25. Transferred to [facility] for routine post-op care.     Interval History: POD 2 s/p .    She is doing well this morning. She is tolerating a regular diet without nausea or vomiting. She is voiding spontaneously. She is ambulating. She has passed flatus, and has not a BM. Vaginal bleeding is mild. She denies fever or chills. Abdominal pain is mild and controlled with oral medications. She Is breastfeeding.     Objective:     Vital Signs (Most Recent):  Temp: 97.6 °F (36.4 °C) (25 0741)  Pulse: 78 (25 0741)  Resp: 18 (25 0036)  BP: 119/71 (25 0741)  SpO2: 97 % (25 0741) Vital Signs (24h Range):  Temp:  [97.6 °F (36.4 °C)-98 °F (36.7 °C)] 97.6 °F (36.4 °C)  Pulse:  [72-91] 78  Resp:  [18] 18  SpO2:  [97 %-98 %] 97 %  BP: (119-129)/(61-71) 119/71     Weight: (!) 138.5 kg (305 lb 3.6 oz)  Body mass index is 42.57 kg/m².      Intake/Output Summary (Last 24 hours) at 2025 0935  Last data filed at 2025 2236  Gross per 24 hour   Intake --   Output 900 ml   Net -900 ml         Significant Labs:  Lab Results   Component Value Date    GROUPTRH O POS 2025    HEPBSAG Non-reactive 2024     Recent Labs   Lab 25  1820   HGB 12.4   HCT 36.8*       I have personallly reviewed all pertinent lab results from the last 24 hours.  Recent Lab Results       None            Physical Exam:   Constitutional: She is oriented to person, place, and time. She appears well-developed. No distress.    HENT:   Head: Normocephalic and atraumatic.    Eyes: Conjunctivae are normal. Right eye  exhibits no discharge. Left eye exhibits no discharge. No scleral icterus.      Pulmonary/Chest: Effort normal. No respiratory distress.        Abdominal: Soft. She exhibits abdominal incision (Pfannenstiel incision with acquacel dressing in place, CDI. Appropriately TTP.). She exhibits no distension.   Fundus firm and below the level of the umbilicus             Musculoskeletal: Normal range of motion. No edema.       Neurological: She is alert and oriented to person, place, and time.    Skin: Skin is warm and dry. She is not diaphoretic.          Assessment/Plan:     30 y.o. female  for:    * Status post primary low transverse  section  Underwent  on 25.  - POD 2  - Tolerating PO  - Pain managed on current regimen  - Voiding spontaneously  - Ambulating  - Ready for discharge    Obesity affecting pregnancy, antepartum  - Lovenox PP while inpatient        Disposition: As patient meets milestones, will plan to discharge today.    Sonia Saez PA-C  Obstetrics  [facility] ([facility])

## 2025-06-20 NOTE — PLAN OF CARE
Patient afebrile and had no falls this shift. Fundus firm without massage and below umbilicus. Dressing intact with small serosanguinous drainage; area marked. No further oozing noted. Bleeding light, no clots passed this shift. Voids spontaneously. Ambulates independently. Pain well controlled with oral pain medication. Vital signs stable at this time. Bonding well with infant; responds to infant cues and participates in infant care. Will continue to monitor.      Problem: Adult Inpatient Plan of Care  Goal: Plan of Care Review  Outcome: Progressing  Goal: Patient-Specific Goal (Individualized)  Outcome: Progressing  Goal: Absence of Hospital-Acquired Illness or Injury  Outcome: Progressing  Goal: Optimal Comfort and Wellbeing  Outcome: Progressing  Goal: Readiness for Transition of Care  Outcome: Progressing     Problem: Bariatric Environmental Safety  Goal: Safety Maintained with Care  Outcome: Progressing     Problem:  Delivery  Goal: Bleeding is Controlled  Outcome: Progressing  Goal: Stable Fetal Wellbeing  Outcome: Progressing  Goal: Absence of Infection Signs and Symptoms  Outcome: Progressing  Goal: Effective Oxygenation and Ventilation  Outcome: Progressing     Problem: Infection  Goal: Absence of Infection Signs and Symptoms  Outcome: Progressing     Problem: Skin Injury Risk Increased  Goal: Skin Health and Integrity  Outcome: Progressing     Problem: Postpartum ( Delivery)  Goal: Successful Parent Role Transition  Outcome: Progressing  Goal: Hemostasis  Outcome: Progressing  Goal: Effective Bowel Elimination  Outcome: Progressing  Goal: Fluid and Electrolyte Balance  Outcome: Progressing  Goal: Absence of Infection Signs and Symptoms  Outcome: Progressing  Goal: Anesthesia/Sedation Recovery  Outcome: Progressing  Goal: Optimal Pain Control and Function  Outcome: Progressing  Goal: Nausea and Vomiting Relief  Outcome: Progressing  Goal: Effective Urinary Elimination  Outcome:  Progressing  Goal: Effective Oxygenation and Ventilation  Outcome: Progressing     Problem: Wound  Goal: Optimal Coping  Outcome: Progressing  Goal: Optimal Functional Ability  Outcome: Progressing  Goal: Absence of Infection Signs and Symptoms  Outcome: Progressing  Goal: Improved Oral Intake  Outcome: Progressing  Goal: Optimal Pain Control and Function  Outcome: Progressing  Goal: Skin Health and Integrity  Outcome: Progressing  Goal: Optimal Wound Healing  Outcome: Progressing

## 2025-06-20 NOTE — SUBJECTIVE & OBJECTIVE
Interval History: POD 2 s/p .    She is doing well this morning. She is tolerating a regular diet without nausea or vomiting. She is voiding spontaneously. She is ambulating. She has passed flatus, and has not a BM. Vaginal bleeding is mild. She denies fever or chills. Abdominal pain is mild and controlled with oral medications. She Is breastfeeding.     Objective:     Vital Signs (Most Recent):  Temp: 97.6 °F (36.4 °C) (25 0741)  Pulse: 78 (25 0741)  Resp: 18 (25 0036)  BP: 119/71 (25)  SpO2: 97 % (25) Vital Signs (24h Range):  Temp:  [97.6 °F (36.4 °C)-98 °F (36.7 °C)] 97.6 °F (36.4 °C)  Pulse:  [72-91] 78  Resp:  [18] 18  SpO2:  [97 %-98 %] 97 %  BP: (119-129)/(61-71) 119/71     Weight: (!) 138.5 kg (305 lb 3.6 oz)  Body mass index is 42.57 kg/m².      Intake/Output Summary (Last 24 hours) at 2025 0935  Last data filed at 2025 2236  Gross per 24 hour   Intake --   Output 900 ml   Net -900 ml         Significant Labs:  Lab Results   Component Value Date    GROUPTRH O POS 2025    HEPBSAG Non-reactive 2024     Recent Labs   Lab 25  1820   HGB 12.4   HCT 36.8*       I have personallly reviewed all pertinent lab results from the last 24 hours.  Recent Lab Results       None            Physical Exam:   Constitutional: She is oriented to person, place, and time. She appears well-developed. No distress.    HENT:   Head: Normocephalic and atraumatic.    Eyes: Conjunctivae are normal. Right eye exhibits no discharge. Left eye exhibits no discharge. No scleral icterus.      Pulmonary/Chest: Effort normal. No respiratory distress.        Abdominal: Soft. She exhibits abdominal incision (Pfannenstiel incision with acquacel dressing in place, CDI. Appropriately TTP.). She exhibits no distension.   Fundus firm and below the level of the umbilicus             Musculoskeletal: Normal range of motion. No edema.       Neurological: She is alert and  oriented to person, place, and time.    Skin: Skin is warm and dry. She is not diaphoretic.

## 2025-06-20 NOTE — LACTATION NOTE
This note was copied from a baby's chart.  Lactation Rounding: infant feeding frequency and output WNL. Infant weight loss noted as -3%    Mother reports that infant has been going to the breast for 5-10 min and that she believes that infant is doing well with latching mother reports that her nipples are WNL but LC did not visualize at this time. Mother states that she has been putting infant to the breast and then feeding infant a formula bottle with artificial nipple. Mother states that she has been pumping breasts after she is done feeding infant formula bottle. Mother states that she has no concerns with latching infant to breast or pumping at this time. Breastfeeding discharge education performed.     Reviewed signs of good attachment. Reviewed breast massage and compression during feedings and indications for use. Reviewed signs of effective milk transfer and instructed to call pediatrician and lactation if signs not present. Discussed expected feeding and output pattern for days of life 2, 3, 4, & 5+; mother instructed to call pediatrician and lactation if infant is not meeting feeding and output goals.     Reviewed proper usage and to adjust suction according to comfort level. Reviewed with mother frequency and duration of pumping in order to promote and maintain full milk supply. Hands on pumping technique reviewed. . Instructed mother on cleaning of breast pump parts. Reviewed proper milk handling, collection, storage, and transportation. Voices understanding.     Written instructions have been provided and were reviewed at this time. Hand expression reviewed, mother able to return demonstrate. Lactation discharge booklet reviewed.    Instruct the mother to:  Sit upright and lean forward if possible.  Apply warm, wet baby blanket/towel over breasts for a few minutes followed by gentle breast massage.  Form a C with her hand and place it about 1 inch back from the areola with the nipple centered between  her thumb and index finger.  Press, compress, relax :  apply pressure in an inward direction toward the breast without stretching the tissue and then compress the breast tissue between her  fingers for a few minutes.  Rotate placement of fingers on the breasts to facilitate emptying.  Collect expressed colostrum/ human milk with a spoon and feed immediately to the baby or place it directly into a sterile storage container for later use.  If stored for later use, place the babys breastmilk label (with the date and time of collection and the names of meds she is taking) on  the container.  Place the container  immediately  into the breastmilk refrigerator or freezer for later use.     Reviewed breast massage and compression during pumping and indications for use. Reviewed signs of engorgement and expectant management. Reviewed signs of mastitis and instructed mother to call OB provider and lactation if any signs present. Discussed proper use of First Alert Form. Reviewed nursing diet and nutrition. Discussed resources for medication safety while breastfeeding. Reviewed available outpatient lactation resources.     Mother is aware of warm line, outpatient consultations, community resources and monthly support groups. Encouraged mother to contact lactation with any questions, concerns, or problems. Contact numbers provided, and mother verbalizes understanding.

## 2025-06-20 NOTE — ASSESSMENT & PLAN NOTE
Underwent  on 25.  - POD 2  - Tolerating PO  - Pain managed on current regimen  - Voiding spontaneously  - Ambulating  - Ready for discharge

## 2025-06-20 NOTE — ANESTHESIA PREPROCEDURE EVALUATION
06/20/2025  Jamaica Leonard is a 30 y.o., female.    Vasa previa   Pre-op Assessment    I have reviewed the Patient Summary Reports.     I have reviewed the Nursing Notes.    I have reviewed the Medications.     Review of Systems  Anesthesia Hx:  No previous Anesthesia                Social:  Former Smoker       Hematology/Oncology:  Hematology Normal   Oncology Normal                                   EENT/Dental:  EENT/Dental Normal           Cardiovascular:  Exercise tolerance: good                NYHA Classification I                             Pulmonary:  Pulmonary Normal                       Renal/:  Renal/ Normal                 Hepatic/GI:  Hepatic/GI Normal                    Musculoskeletal:  Musculoskeletal Normal                Neurological:  Neurology Normal                                      Endocrine:  Endocrine Normal          Morbid Obesity / BMI > 40  Dermatological:  Skin Normal    Psych:   anxiety                 Physical Exam  General: Well nourished and Cooperative    Airway:  Mallampati: II   Mouth Opening: Normal  Tongue: Normal  Neck ROM: Normal ROM    Chest/Lungs:  Clear to auscultation, Normal Respiratory Rate    Heart:  Rate: Normal  Rhythm: Regular Rhythm        Anesthesia Plan  Type of Anesthesia, risks & benefits discussed:    Anesthesia Type: Epidural, CSE, Spinal  Intra-op Monitoring Plan: Standard ASA Monitors  Post Op Pain Control Plan: epidural analgesia, multimodal analgesia and intrathecal opioid  Airway Plan: , Post-Induction  Informed Consent: Informed consent signed with the Patient and all parties understand the risks and agree with anesthesia plan.  All questions answered. Patient consented to blood products? Yes  ASA Score: 2  Day of Surgery Review of History & Physical: H&P Update referred to the surgeon/provider.  Anesthesia Plan Notes: Verbal preop per  Chriss Porras    Ready For Surgery From Anesthesia Perspective.     .

## 2025-06-20 NOTE — DISCHARGE SUMMARY
[facility] ([facility])  Obstetrics  Discharge Summary      Patient Name: Jamaica Leonard  MRN: [id number]  Admission Date: 2025  Hospital Length of Stay: 2 days  Discharge Date and Time: 2025 9:39 AM  Attending Physician: Sushil Choudhary MD   Discharging Provider: Sonia Saez PA-C   Primary Care Provider: Renetta, Primary Doctor    HPI: 29 yo G1 with IUP at 36w6d was sent from [facility] clinic for C/S delivery today secondary to newly diagnosed vasa previa without bleeding.  Pt reports no complaints.  Denies pain or bleeding.  Is ready to proceed with C/S.        Procedure(s) (LRB):   SECTION (N/A)     Hospital Course:   Underwent  on 25. Transferred to [facility] for routine post-op care.          Final Active Diagnoses:    Diagnosis Date Noted POA    PRINCIPAL PROBLEM:  Status post primary low transverse  section [Z98.891] 2025 Not Applicable    Obesity affecting pregnancy, antepartum [O99.210] 2024 Yes      Problems Resolved During this Admission:    Diagnosis Date Noted Date Resolved POA    Vasa previa affecting labor and delivery in mo pregnancy [O69.4XX0] 2025 Yes        Significant Diagnostic Studies: Labs: BMP:   Recent Labs   Lab 25  1820   GLU 73      K 4.0      CO2 18*   BUN 10   CREATININE 0.6   CALCIUM 9.3   , CMP   Recent Labs   Lab 25  1820      K 4.0      CO2 18*   GLU 73   BUN 10   CREATININE 0.6   CALCIUM 9.3   PROT 6.9   ALBUMIN 3.1*   BILITOT 0.2   ALKPHOS 97   AST 11   ALT 9*   ANIONGAP 11   , CBC   Recent Labs   Lab 25  1820   WBC 11.57   HGB 12.4   HCT 36.8*      , and All labs within the past 24 hours have been reviewed      Feeding Method: breast    Immunizations       Date Immunization Status Dose Route/Site Given by    25 MMR Incomplete 0.5 mL Subcutaneous/     25 Tdap Incomplete 0.5 mL Intramuscular/             Delivery:    Episiotomy:    "  Lacerations:     Repair suture:     Repair # of packets:     Blood loss (ml):       Birth information:  YOB: 2027   Time of birth: 8:05 PM   Sex: female   Delivery type: , Low Transverse   Gestational Age: 36w6d     Measurements    Weight: 3147 g  Weight (lbs): 6 lb 15 oz  Length: 48.3 cm  Length (in): 19"  Head circumference: 33.5 cm  Chest circumference: 32 cm  Abdominal girth: 32 cm         Delivery Clinician: Delivery Providers    Delivering clinician: Sushil Choudhary MD   Provider Role    Whitney Clayton, RN Registered Nurse    Carlie Gentile RN Registered Nurse    Maribell Gooden ST Registered Nurse    Nicole Billingsley RN Registered Nurse    Regine Espinoza, [facility] [address] Assist    Chriss Greco, CRNA Nurse Anesthetist             Additional  information:  Forceps:    Vacuum:    Breech:    Observed anomalies      Living?:     Apgars    Living status: Living  Apgar Component Scores:  1 min.:  5 min.:  10 min.:  15 min.:  20 min.:    Skin color:  0  1       Heart rate:  2  2       Reflex irritability:  2  2       Muscle tone:  2  2       Respiratory effort:  2  2       Total:  8  9       Apgars assigned by: NICOLE BILLINGSLEY RN         Placenta: Delivered:       appearance  Pending Diagnostic Studies:       None            Discharged Condition: good    Disposition: Home or Self Care    Follow Up:   Follow-up Information       Sonia Saez, PAAamirC Follow up in 1 week(s).    Specialty: Obstetrics and Gynecology  Why: Dressing removal  Contact information:  [address] [address] [phone]               Sushil Choudhary MD Follow up in 4 week(s).    Specialty: Obstetrics and Gynecology  Why: Post-operative visit  Contact information:  [id number]  [place] [address] [phone]                           Patient Instructions:      Diet Adult Regular     Lifting restrictions   Order Comments: No lifting anything over 15lbs for the next 6 " weeks.     No driving until:   Order Comments: No driving while taking pain medication.     Pelvic Rest   Order Comments: No tampon use, douching, or intercourse for 6 weeks.     Leave dressing on - Keep it clean, dry, and intact until clinic visit   Order Comments: Dressing will be removed at 1 week nurse visit.  Showers only- no baths for 6 weeks.     Notify your health care provider if you experience any of the following:  temperature >100.4     Notify your health care provider if you experience any of the following:  severe uncontrolled pain     Notify your health care provider if you experience any of the following:  redness, tenderness, or signs of infection (pain, swelling, redness, odor or green/yellow discharge around incision site)     Notify your health care provider if you experience any of the following:  difficulty breathing or increased cough     Notify your health care provider if you experience any of the following:  severe persistent headache     Notify your health care provider if you experience any of the following:  worsening rash     Notify your health care provider if you experience any of the following:  persistent dizziness, light-headedness, or visual disturbances     Notify your health care provider if you experience any of the following:  increased confusion or weakness     Medications:  Current Discharge Medication List        START taking these medications    Details   ibuprofen (ADVIL,MOTRIN) 800 MG tablet Take 1 tablet (800 mg total) by mouth every 8 (eight) hours.  Qty: 30 tablet, Refills: 0    Associated Diagnoses: Status post primary low transverse  section      oxyCODONE-acetaminophen (PERCOCET)  mg per tablet Take 1 tablet by mouth every 6 (six) hours as needed for Pain.  Qty: 15 tablet, Refills: 0    Comments: Quantity prescribed more than 7 day supply? No  Associated Diagnoses: Status post primary low transverse  section      senna-docusate (PERICOLACE)  8.6-50 mg per tablet Take 1 tablet by mouth nightly as needed for Constipation.  Qty: 30 tablet, Refills: 0    Associated Diagnoses: Status post primary low transverse  section           CONTINUE these medications which have NOT CHANGED    Details   famotidine (PEPCID) 40 MG tablet Take 1 tablet (40 mg total) by mouth 2 (two) times daily.  Qty: 30 tablet, Refills: 6      ondansetron (ZOFRAN-ODT) 8 MG TbDL Take 1 tablet (8 mg total) by mouth every 6 (six) hours as needed.  Qty: 20 tablet, Refills: 1      prenatal 25/iron/folate 6/dha (PRENA1 ORAL) Take by mouth.             Sonia Saez PA-C  Obstetrics  [facility] ([facility])

## 2025-06-23 LAB
ESTROGEN SERPL-MCNC: NORMAL PG/ML
INSULIN SERPL-ACNC: NORMAL U[IU]/ML
LAB AP DIAGNOSIS CATEGORY: NORMAL
LAB AP GROSS DESCRIPTION: NORMAL
LAB AP PERFORMING LOCATION(S): NORMAL
LAB AP REPORT FOOTNOTES: NORMAL

## 2025-06-25 ENCOUNTER — POSTPARTUM VISIT (OUTPATIENT)
Dept: OBSTETRICS AND GYNECOLOGY | Facility: CLINIC | Age: 30
End: 2025-06-25
Payer: COMMERCIAL

## 2025-06-25 VITALS
HEIGHT: 71 IN | DIASTOLIC BLOOD PRESSURE: 68 MMHG | WEIGHT: 290.38 LBS | SYSTOLIC BLOOD PRESSURE: 120 MMHG | BODY MASS INDEX: 40.65 KG/M2

## 2025-06-25 DIAGNOSIS — Z98.891 STATUS POST PRIMARY LOW TRANSVERSE CESAREAN SECTION: Primary | ICD-10-CM

## 2025-06-25 PROCEDURE — 99999 PR PBB SHADOW E&M-EST. PATIENT-LVL III: CPT | Mod: PBBFAC,,, | Performed by: PHYSICIAN ASSISTANT

## 2025-07-02 ENCOUNTER — TELEPHONE (OUTPATIENT)
Dept: OBSTETRICS AND GYNECOLOGY | Facility: CLINIC | Age: 30
End: 2025-07-02
Payer: COMMERCIAL

## 2025-07-02 NOTE — TELEPHONE ENCOUNTER
Two pt identifiers identified. Spoke with pt regarding rescheduling appointment on 07/31 due to Dr. Choudhary not being in clinic that day. Rescheduled.

## 2025-07-08 ENCOUNTER — TELEPHONE (OUTPATIENT)
Dept: LACTATION | Facility: CLINIC | Age: 30
End: 2025-07-08
Payer: COMMERCIAL

## 2025-07-08 NOTE — TELEPHONE ENCOUNTER
Mother reports that she has been pumping but she is concerned that she has low milk supply. Mother reports that she has been pumping her breasts every 3 hours for 30-45 minutes and she is able to express 1.5 oz each pumping session. Mother states that she is getting most of the milk from the right breast and only a small amount from the left side. Mother states that she is using a 17 mm flange on the right and and 21 mm flange on the left. Mother reports that pumping is comfortable, but is unsure if the sizes are correct. Offered mother an outpatient clinic appointment on Friday 7/11/25 @ 2:45 pm. Mother accepted. Advised mother to pump breasts until breasts are empty, but mother reports that she never noticed the breast milk spraying. Advised mother to pump for 20 minutes every 2-3 hours and can add a power pumping session each day until appointment scheduled. Education provided to mother on how to power pump breasts. Encouraged mother to pump breasts for 20 minutes, then rest 10 minutes, then pump 10 minutes and continue this pattern for one hour. Encouraged mother to power pump for three days and see if milk supply increases with the power pumping and pumping breasts more frequently. Mother verbalizes understanding of all education and counseling. Mother denies any further lactation needs or concerns at this time. Discussed lactation availability. Encouraged mother to call for assistance when needs arise.

## 2025-07-11 ENCOUNTER — CLINICAL SUPPORT (OUTPATIENT)
Dept: LACTATION | Facility: CLINIC | Age: 30
End: 2025-07-11
Payer: COMMERCIAL

## 2025-07-11 ENCOUNTER — PATIENT MESSAGE (OUTPATIENT)
Dept: OBSTETRICS AND GYNECOLOGY | Facility: CLINIC | Age: 30
End: 2025-07-11
Payer: COMMERCIAL

## 2025-07-11 ENCOUNTER — PATIENT MESSAGE (OUTPATIENT)
Dept: LACTATION | Facility: CLINIC | Age: 30
End: 2025-07-11

## 2025-07-11 DIAGNOSIS — Z71.9 HEALTH EDUCATION/COUNSELING: Primary | ICD-10-CM

## 2025-07-11 PROCEDURE — 99999 PR PBB SHADOW E&M-EST. PATIENT-LVL I: CPT | Mod: PBBFAC,,,

## 2025-07-11 NOTE — PATIENT INSTRUCTIONS
¶ Breastfeeding:  ~Feed for a maximum of 10 minutes on right breast then offer supplement via bottle.              ¶ Supplemental pumping:   ~Pump both breasts for 15-20 minutes using hands on pumping technique at least 8 times per day  ~Use nipple balm or food safe oil around the base of your nipple for lubrication when pumping  ~Breast pump flanges:       -Discussed flange fit and visual education provided on when adjusting flange size may be needed.       -decrease flange size to 17mm, flange insert education provided.               ¶ Supplemental feedings:  ~Offer at least 3  oz per bottle after each breastfeeding session  ~Increase volume offered in bottle if infant is still showing feeding cues  ~Decrease nipple flow on bottle to Preemie   ¶ Infant:  Exercises:Supervised tummy time 3-4 times per day  Additional therapies: None at this time   Keep daily journal of: Breastfeeding - how many minutes each side and frequency  Pumping- how much collected each side  Bottlefeeding- how much baby takes each time and frequency  Wet diapers- how many per 24 hours  Dirty diapers- how many per 24 hours, note any changes in color or consistency  Change pacifier to one that is round like valeria menchaca, colleen burnette or Dr Davidson happypaci              ¶ Maternal:  Low / delayed milk supply education and plan  Damaged nipple healing plan  Contact maternal OB/primary care doctor regarding low milk supply labs   Galactogogue education  Consult with maternal and pediatric doctors regarding galactogogue use discussed at todays appointment    ADDITIONAL EDUCATION:   ¶ Damaged nipples: Healing damaged/sore nipples    What causes sore and damaged nipples  Poor positioning  Disorganized infant sucking, including biting   Infant tongue tie  Improper breast pump flange sizes and/or turning the pump suction too high  Nipple infection such as yeast  Vasospasm of the nipples  Inverted nipples  Skin infections around your  nipple    Make sure to continue to work on whatever underlying issue is causing your pain or nipple damage.    Treatment of sore/damaged nipples:  Moist wound healing is now the recommended treatment for sore/damaged nipples, according to the Academy of Breastfeeding Medicine. This is referring to the internal moisture of the nipple and not the outside skin.     Begin feeding from the least sore side first, change latching positions to see which is more comfortalbe.   After breastfeeding or pumping, express a few drops of breast milk and rub into your nipple. Allow this to airy dry before putting on clothing (unless you have vasospasm).   You can also use breast shells to help prevent your nipples from sticking and rubbing on your clothing.   Do not wash your nipples with soap as this can dry your nipples out.   Make sure you are getting a proper latch with breastfeeding and make sure you are breaking baby's latch before removing them from the breast.   Latch video: Global health media: Attaching Your Baby at the Breast - Video - Fan[employer] Project   Change positions that you feed your baby in. For example, if you always feed in cross cradle hold, try using football hold. This will allow your baby's mouth to hit different areas on your nipple and may reduce pain.   Unlatch you baby from your breast when you feel they are no longer actively eating. If they are using you as a pacifier, this may increase nipple soreness.   If using all the above suggestions did not help and breastfeeding is too painful to latch, you may benefit from giving your nipples a break for a few days until they are healed. You can pump and feed your baby expressed milk.     The following are no longer a recommended treatment protocol from the Academy of Breastfeeding however, you may find some relief using these, just be cautious that these may cause further swelling and inflammation worsening your symptoms.     Several times per day  use a saltwater soak to your nipples.   Saline salt: Mix 1/2 teaspoon of salt in one cup of warm water. Make a fresh supply each day to avoid bacterial contamination. You may also buy individual-use packets of sterile saline solution. Soak nipple(s) in a small bowl of warm saline solution for a minute or long enough for the saline to get onto all areas of the nipple. Avoid prolonged soaking (more than 5-10 minutes) that super hydrates the skin, as this can promote cracking and delay healing.  Epsom salt: Mix 2 tsp of Epsom salt into 1 cup of warm water and soak the breast or nipple 2-3 times a day for 5-10 minutes.          Using the following may also be helpful with damaged nipples. These will help keep your nipple moist to promote healing and help prevent scab formation.  Vaseline   Good at preventing scab formation  Olive oil   Studies show it has a similar effect in healing sore nipples as expressed milk  coconut oil  studies show it helps with healing and preventing nipple cracks  You can put on nursing pad then place in fridge to create a cold pack  It has anti-inflammatory, anti-bacterial and anti-fungal properties  Hydrogels  Caution for bacterial growth, make sure you are changing out your pads frequently.  You can place them in fridge to chill to aid in healing.  Silverettes  Make sure you get an authentic silver if you buy an off brand of Silverettes  Cannot be used with other products as it makes the silver ineffective.  APNO (all-purpose nipple ointment)        Use as last resort if above recommendations didn't work.  This consists of 3 ingredients:   An antibiotic: POLYSPORIN Antibiotics help to heal nipple pain by stopping the growth of bacteria. Preventing the growth of bacteria on the nipples can also help protect against mastitis.   An anti-inflammatory:  CORTISONE 10 This type of medication eases nipple pain by reducing any swelling caused by injury, infection, or skin irritation.  An  anti-fungal: LOTRIMIN (CLOTRIMAZOLE) OR MONISTAT (MICONAZOLE) The anti-fungal ingredient in APNO helps to fight off Candida. Candida is the yeast that causes the fungal infection called thrush.   Mix equal parts of the ingredients listed above. These three ingredients work together to help soothe the pain and fight off the common organisms that cause sore nipples.  To use all-purpose nipple ointment, apply it sparingly (just enough to makes your nipples and areola area shiny) after each pumping or nursing session. Don't wash or wipe it off.  You should not need to use APNO indefinitely.           MediHoney Paste (100% Active Leptospermum Honey) is the most appropriate of the Medihoney products for this use. When using MediHoney, we recommend the following:   After nursing, gently cleanse the nipple area with a warm washcloth to remove any of the babys saliva.   Extrude a small amount of MediHoney from the tube and gently apply to the nipple and the areola (the colored area around the nipple)   Apply a nursing pad over the nipple area and wear a bra to help secure the pad   Just prior to nursing your baby, use a warm wet washcloth to clean off any MediHoney prior to allowing the baby to latch.               MediHoney Paste is 100% honey and contains no additives and is filtered and sterilized to remove any bacteria or spores.    References: 1. Jericho M, Jagjit CM,  J, Alvarado D. Factors that influence breastfeeding decisions among Special Supplemental Nutrition Program for Women, Infants and Children participants from [facility]. J Am Diet Assoc 2010; 110:624-7. 2. Eric MM, Connor THERON, Shelley J, Bryon E. Methods to prevent and manage nipple pain in breastfeeding women. [place] J Nurs Res 1990; 12:732-44.    Resources:  Sore Nipples - International BreastFeeding Guy (ibconline.ca)  Healing Tips for Nipple Cracks or Abrasions - KellyMom.com  [facility] Clinical Protocol #26: Persistent Pain with Breastfeeding  (bfmed.org)  Comparative Effectiveness of Olive Oil and Breast Milk on Nipple Soreness in Breastfeeding Mothers  Breastfeeding Medicine       ¶ Delayed/low milk supply education and plan: Low/delayed milk production  What is low or delayed milk supply  Low milk supply is when you are not producing enough milk for your infant needs after your milk has come in  Delayed milk supply is when your milk supply has not come in within 3-5 days of delivery  Reasons for Low milk production  The most common reason is insufficient milk removal which can be caused by weak infant suck, tongue tie, improper latch, giving bottles or pacifiers in place of going to breast, skipping breastfeeding's and not pumping in place of them, or getting off to a slow start with breastfeeding  Increased blood loss during delivery (over 500 ml) or retained placental fragments  A history of polycystic ovarian syndrome (PCOS), diabetes, thyroid disorders, insulin resistance, theca lutein cysts, anemia  Insufficient glandular tissue (lack of breast tissue growth during puberty and pregnancy)  Breast surgeries, biopsies, or breast trauma  Pregnancy  Certain medications, including placenta pills and birth control pills  Stress can increase your cortisol production which in turn decreases milk supply  Tobacco, drug, and/or alcohol use  Reasons for delayed milk production  Obesity- this causes estrogen to remain high in your breast which can cause a delay in milk production  Stress- causes your cortisol levels to rise and decreases milk production  Loss of blood during delivery can cause anemia which disrupts your hormones  Continued IV fluids during delivery can cause swelling in breast that delays milk production  How your body makes milk  Once your baby is born and your placenta is delivered, this causes a hormone shift that allows for your milk supply to begin.   As milk is removed from your breast by your baby or a breast pump, this tells your body  to make more milk. Also, if milk is not removed by your baby or a breast pump, this tells your body not to make more milk.   How frequently and how effective your baby nurses also affects your milk supply. If your infant can remove enough milk and nurses frequently, this will establish an adequate milk supply. However, if your infant cannot get enough milk from the breast, you will need to pump for adequate milk production.  Your body reaches peak milk production around 4 weeks after delivery. Most of the increase in milk supply will be seen around 2 weeks. If enough milk is not removed during this time, you may see a decrease in milk production.   Signs of low milk supply  If nursing:  Your baby is not gaining an adequate amount of weight  You do not hear frequent swallows at the breast even though your baby seems to be nursing  Your baby is not having adequate amounts of wet/dirty diapers  Your baby is falling asleep at the breast in the first few minutes of the feeding  If pumping:  You are unable to pump enough milk to satisfy your babyHow to increase milk production  How to increase milk supply  Increasing a low supply is hard work, but with commitment it can be done. It is a short term plan to meet your long term breastfeeding goal.   General things you can do to increase supply:  Get adequate nutrition and hydration.  More skin to skin with your infant.   This will help release the hormone oxytocin that helps your milk to flow.   Nurse infant more frequently.   Make sure you are keeping baby active at the breast and hearing frequent swallows. If this is not achievable, you may want to stop nursing and pump your breast for adequate milk removal and feed infant via bottle.   Make sure you have adequate milk removal with atleast 8-12 feedings/pumpings per day.   You may need to add pumping to ensure enough milk is being removed. Make sure you are using the correct size flanges as too big or too small can affect  milk removal. Add in power pumping 1-2 times per day, this is when you pump 20 minutes then rest 10 minutes then pump 10 minutes and rest 10 minutes then pump again for 10 minutes.   Use relaxation techniques to reduce stress and help promote the flow of milk  If you do not see an increase in milk supply, within a week, with the above measures taken you may want to:   begin a galactogogue.  Galactagogues:   MotherLove [place] and Legandairy [employer] (choose 1)   Grains: Oats, Barley, brown rice, quinoa, millet  Legumes: chickpeas, lentils, beans (green, kidney, black), peas  Vegetables: asparagus, carrots, yams, sweet potatoes, dark leafy greens (spinach, sandra, mustard greens, broccoli, seaweed)  Nuts and seeds : flaxseeds, almonds, bonifacio seeds, cashews  Fruit: dates, figs, avocado  Fats: butter, olive oil, coconut oil  herbs and spices: marjoram, basil, fennel, anise, dill, dana, cumin, garlic, onion  Avoid large amounts of: parsley, bassam, thyme, peppermint, rosemary  request lab work from your doctor  blood count for anemia  thyroid stimulating hormone (TSH) - levels too high or too low can affect milk production  human chorionic gonadotropin (hCG) for retained placenta  prolactin levels -tests should be done before breastfeeding, and again 45 minutes later to gauge the surge.  Testosterone - If elevated, an ultrasound can confirm the presence of gestational ovarian theca lutein cysts. These benign cysts, while rare, can delay the onset of milk increase by weeks.  Insulin - Insulin resistance, which results in high levels of blood sugar, can be a factor in low milk production.  Flange fit education: Correct fit of a breast flange for pumping breast milk    Flange fit video https://youtu.be/[email or web address]         This drawing shows the proper fit of a flange for pumping breast milk. (The flange is the cone-shaped piece that fits over the breast and connects to the pump.) The nipple should be centered and move  easily within the tunnel. If the flange is too small, the nipple will rub against the sides of the tunnel. This can cause pain and even injury to the nipple. If the flange is too large, it will pull your areola tissue into the flange tunnel and air can leak out the sides and milk won't flow as well.  ¶ Flanges: Flange insert kits:  Thin and flexible Amazon.com : Nuliie 10PCS Flange Sizing Kit 13/15/17/19/21mm for 24mm Flange/Shield of Most Pumps, Silicone Flange Insert for Momcozy/Spectra/Bellababy Breast Pump, Breastfeeding Essential Pumping Kit for Moms : Baby     Thicker to allow for more areola control but not clear Amazon.com: [place]: Flange Insert    Off brand hard flanges:  Spectra offbrand hard flanges:   One piece:Amazon.com : [place] 21mm Flange and [place] Valve Compatible with Spectra S1 Spectra S2 Spectra 9 Plus Not Original Spectra 20mm Replace Pump Parts Spectra Flange Spectra Duckbill Valve : Baby     Separate flanges and connector: Amazon.com : Digital Domain Media GroupFit Comfy Series 15 mm Two-Piece Design Breastshield Compatible with Medela Breast Pump Parts; Replace Medela Shields; Fit MaxFlow Flex Connector; 2pc : Baby     Medela offbrand hard flanges: Amazon.com: TabSys 19 mm Small Shields, Compatible with Medela Breast Pump- PersonalFit, Freestyle, [place], Maxi, Flex Connector, Connect to [place] Connector, 2pcs : Baby     Amazon.com: Nenesupply 17mm Flange Compatible with Medela Breast Pump Parts Replacement 17mm Flange for Medela Accessories Compatible with Pump in Style Parts Symphony Swing [place] Work with Personalfit Flex : Industrial & Scientific     Elastic nipple:  Pumpin pals: Small Set - Pumpin' Pal Angled Breast Pump Flanges (pumpinpal.com)     Pumping pretty: Amazon.com : Pumping Pretty Pump Flange Inserts, Compatible for Wearable Breast Pumps, fits 24mm to 30mm Flanges, Breast Pump Accessories, Flange Inserts Suitable for All Nipples, 2-Pc Set (13 mm) : Baby     Maymom inserts:  "Amazon.com : Maymom Flange Insert 21 mm Compatible with Medela and Spectra 24 mm Shields/Flanges. Use with Medela Freestyle and Momcozy S9/S11/S12 to Reduce Nipple Tunnel Down to 21 mm; Fit Momcozy 24 mm Cup. 2pc : Baby     Maymom hard flanges (pano or comfy) fits medela and specta: Amazon.com : Maymom MyFit Comfy Series 15 mm Two-Piece Design Breastshield Compatible with Medela Breast Pump Parts; Replace Medela Shields; Fit MaxFlow Flex Connector; 2pc : Baby     BeauGen: Amazon.com : BeauGen Breast Pump Cushion - Soft Stretchy Clear Plastic Insert for Flange Comfort, Fit - Breastfeeding Essential - BPA Free, Food Safe - Fits 21mm, 22mm, 23mm, 24mm, 25mm, 26mm, 27mm, 28mm (1 Pair) : Baby     Longer flange inserts: Amazon.com : MaymoNeodyne Biosciences Flange Inserts 19 mm for Medela, Spectra 24 mm George/Flanges, Momcozy/Reeves Wearable Cup. Compatible with Medela Freestyle, [place] to Reduce 24mm Nipple Tunnel Down to 19 mm; 2pc/Each : Baby       BREASTFEEDING:  Alternate your starting breast with each feeding.  Video reference: "Attaching Your Baby at the Breast" by Facebook is a breastfeeding video that can be very helpful with positioning and latch techniuqe; https://ComparaOnline.org/portfolio-items/attaching-your-baby-at-the-breast/        SUPPLEMENTATION:  Use paced bottle feeding and hold bottle horizontally. Elicit gape and proper latching (stroke nipple downward on lips, wait for open mouth before inserting bottle nipple.    Paced Bottle Feeding References:  "Paced Bottle Feeding" by the Milk Mob, https://www.youtube.com/watch?v=qfhM05Chl3h  "Mama Natural" information and video, https://www.[facility]/paced-bottle-feeding/    MILK EXPRESSION & STORAGE  Pumping:  If pumping more than baby will need, store milk in refrigerator or freezer as discussed.   Hand Expression:  Video Reference: "How to Express Breastmilk" by Global [employer] Media, " https://globalhealthmedia.org/portfolio-items/how-to-express-breastmilk/     Milk Storage Guidelines:  Room Temperature: 4 hours  Refrigerator: 4 days  Freezer: 3-12 months, depending on type of freezer  Layering Breast milk  You may add new freshly expressed milk to previously chilled or frozen milk. Chill the new milk prior to adding it to the container of milk. The expiration date on the container of milk will be from the date of the oldest milk. It is best to freeze milk in feeding sized quantities. If you are just starting to pump, you may not yet have an idea of what will be the right size for your baby. Freeze in 1-2 oz. quantities to start. You dont want to thaw out more milk than your baby will take in 24 hours. After you have some experience with how much your baby takes from a bottle, you can freeze milk in that quantity.  Thawed  The oldest milk should be used first. Breast milk can be thawed and brought to room temperature by briefly standing the container of milk in warm water. Never make it warmer than body temperature. Never use a microwave to thaw or warm breast milk. Discard any milk left in a bottle within 1 hour after a feeding. Thawed, refrigerated breast milk must be discarded after 24 hours. Do not re-freeze it.   Transporting  Chill any milk that you pump in a refrigerator or a portable cooler bag. A cooler bag with frozen gel packs can be used to transport the milk home    BREASTFEEDING RESOURCES:    [place] media: https://Ache[employer].org/topic/breastfeeding/   - Cloudcity.com     TETHERED ORAL TISSUE (TOT):  Https://www.youtube.com/watch?v=VRid2gn1BPS&c=430z  Https://www.youtube.com/watch?v=QoUFiCWGIRM  https://www.youtube.com/watch?v=Vu6gddwGXaA  Https://www.youtube.com/watch?v=aiNX4OW5q90     Contact Numbers:     Lactation Warmline [phone]   7 days a week, call for phone support. Messages will be returned daily by 4pm.    [facility] office phone [phone]   M-F  8am-4pm, Call with any scheduling needs or feeding/pumping concerns.  Messages will be returned M-F 8am-4pm    [facility] mobile phone [phone]   7 days a week. Call or text for urgent scheduling needs, urgent feeding/pumping concerns, and to discuss plan of care as needed or directed. Provide baby's name and date of birth with initial contact.   Messages or calls received late in the day may not be returned until the following day.

## 2025-07-11 NOTE — PROGRESS NOTES
Lactation consultation    Date: 2025  Time In: 1450   Time Out: 1700   Patient Name: Jamaica Leonard  MRN: [id number]   Pediatrician:Yahir Buchanan    Medical Diagnosis:   Problem List[1]     Age: 30 y.o.      Current feeding goal: breast and bottle EBM      Subjective     Chief Complaint:  Jamaica Leonard's parent(s) report(s) that the main concern(s) include see table below for reported concerns MATERNAL CONCERNS: nipple soreness.  Infant (Moshe) is 3 weeks old.     Parent reported the following feeding concerns:     Symptom Breast Bottle   Poor/shallow latch []  []    Chomping/Gumming []  []    Milk loss from lips []  [x]    Coughing/choking []  []    Audible gulping []  []    Arching  []  []    Quick fatigue [x]  []    Tucked upper lip []  []    Popping on/off []  []    Gagging []  []    Labored breathing []  []    Spit up []  []    Clicking  []  []          Yes No   Riding letdown  []   []    Lip blisters [] []   Coated tongue [] []   Frequent hiccups [] []   Gassy/colic/explosive stools [] []   Weight gain concern [] []   Painful latching [] []   Milk supply concerns [] []   Trouble with NNS [] []     Prenatal/Birth History:     Living children 1   Previous Breastfeeding experience: No     OB provider: Carrie Lopez   Pregnancy Concerns: Vasa Previa   Delivery type and reason: , due to above   Delivery Complications: without complications  36 week 6 day(s) GA; single birth; 6 lb 15.0 oz  Infant complications: blood sugar issues  NICU admit, transfer, or readmit: no   Feeding history in hospital: fair due to sleepy at breast    Infant's medication/health history:   Jamaica has a current medication list which includes the following prescription(s): famotidine, ibuprofen, prenatal 25/iron/folate 6/dha, and senna-docusate.   Review of patient's allergies indicates:   Allergen Reactions    Vancomycin analogues Swelling      Is infant currently being treated for any medical conditions: No    Mother's  medication:  Medication allergy: Vancomycin  Current medications: Pepcid   Current supplements: Prenatals    Pertinent Maternal Health History:        Endocrine: denies  Surgeries: appe, T&A    Psychosocial:ADHD  Do you have help at home: Yes  Do you feel you and your baby are safe at home: Yes    Feeding and Nutritional History:  Pt is currently bottle with expressed breast milk and bottle with Similac Neosure  Bottle: 8 times/day. Reason: difficulty latching  Pt consumes 3 oz per bottle feeding.   Bottle feeding length: 20-25 minutes    Bottle type: Dr. Davidson     Flow/nipple: 1  Pacifier use: Tommee Tippee (Square) ?  Sleep:     Maternal pumping  Type of pump: Spectra    Double pumping  Flange size: 17, 19 mm  X per day: 8 with one power sessions  Time per session: 20 minutes  Volume: 1.5-2 oz oz R>L (Left a few mls, most of the milk comes from the right)   2 oz in am and with power pumping   Pain: mild pain with pumping described as sore with initial pumping    Infant 24 hour output  Voids:  6+   Stools:  2-3 brown and yellow pasty      Objective   Mood   fussy    Body Assessment  WNL    Skin Assessment  WNL    Oral Assessment:   Mandible: recessed    Cheeks:   BUCCAL PADS: adequate  BUCCAL STRENGTH: moderate  BUCCAL TONE: WNL  BUCCAL ATTACHMENT: none    Lips:  STRUCTURE: Symmetrical at rest and suck blister  LABIAL FUNCTION: tension that causes gum blanching with passive flanging of upper lip    Tongue:  STRUCTURE: WNL  FRENUM ATTACHMENT: superior attachment of lingual frenum: inserts at posterior tongue  inferior attachment of the lingual frenum: attached to floor of mouth or well below ridge  elasticity: moderately elastic  firm tense floor of mouth  LINGUAL FUNCTION:    Posture during cry: Not observed   Resting posture: unable to assess due to infant not being in a rested state   Lateralization: dumping bilateral   Extension: beyond lower lip   Elevation: within normal limits    Gag: not elicited    Palate:  high, anterior bubble       Suck Assessment:   Suck strength: fair  Motion:piston (recruitment of accessory muscles)   Cupping: fair  With gentle chin tugging, is suction broken: Yes      BREAST ASSESSMENT- MOTHER    Right:        Nipple: everted and scabbed  breast: symmetrical and pendulous  areola: soft and elastic    Left:        Nipple: everted and scabbed  breast: symmetrical and pendulous  areola: soft and elastic      FEEDING ASSESSMENT    Infant pre-feeding weight dry diaper: 3726 g     PUMPING/ EXPRESSION  Last pumped: 3 hours ago  Type:  Spectra 2   Flange size: 17 mm  Amount collected: 1.5    Time pumped: 20 minutes  Pain: no pain with pumping      SUPPLEMENT    Method: bottle Dr. Fall's  both EBM and formula Nipple flow: 1, T, P     Gape [] adequate [x] narrow [] wide []    Latch [] deep [x] moderate [] shallow [] Cannot maintain    [] unsuccessful []required intervention [] full assist [] nipple shield   Lip flange [x] top flanged/neutral [x] bottom flanged [] top tucked [] bottom tucked   Oral seal [x] adequate [] poor    Cheeks [x] round [] dimpled [] broken cheek line [] flat   Jaw [] rocker [x] piston [] chomping [] fasciculations   Swallow [x] observed [] not observed [] none [] gulping   Swallow rate [x] appropriate [] high suck to swallow [] variable [] frequent pauses   Difficulties [x] milk leaking [] choking/coughing [] arching [] Clicking/smacking    [] reflux symptoms [] fatigue [] crease above top lip and on chin and bilateral nasolabial fold [] Unsustained tongue extension    []labored breathing [] nasal flaring []lip blanching []stridor    []chin tucking []dysregulation []Biting/chewing [x]Compensating with other muscles    [] gagging [] pulling away [] popoffs [] short suck bursts   After feeding:   Baby after feeding [x]content [x]sleepy []Fatigued []fussy    [] Reflux/spit up [] arching [] feeding cues  [] hiccups   Minutes: 16 Amount: 3 oz    Notes:  Level 1 too fast, took 5 minutes  with T nipple, still spilling milk with chin and cheek support, Switched to P nipple for last 1 oz, took 6 minutes, no/minimal spilling with P nipple.        Assessment     Feeding efficiency: Needed preemie nipple and cheek support with supplementation via bottle   Weight gain: 8 lbs 3.4 oz (BW 6 lbs 15 oz)   Oral assessment: Unable to determine if TOT is affecting function at this time   Body assessment: WNL  Additional infant concerns: none    Breast drainage: adequate with pumping  Maternal milk supply: inadequate due to unknown factors   Maternal anatomy: impaired due to: see breast assessment  Maternal comfort: impaired due to nipple soreness/damage and incorrect flange size  Additional maternal concerns: increased stress    Summary   Pumping every 3 hours for 20 minutes with 1 power pumping session per day. Was pumping more but decreased after talking to LC on [employer]. Is getting about 1.5 oz per pumping session, 2 oz in the am and with power pumping. Supply is very low on left breast, most of the milk comes from the right. Never experienced engorgement. Breasts did increase in size during pregnancy. Adjusted flange size on left breast and had her decrease suction. Nipples are scabbed/abraded. Discussed nipple care. Mother plans to get labs done with midwife. Would like to latch baby again and we will try at next session due to baby already receiving a bottle. Baby was having a lot of milk spillage with bottle, best with preemie nipple and cheek support in side-lying, mother educated on how to do this at home.     Appointments     Referrals Recommended:   None at this time    Follow up:  Lactation in 1 week      Interventions recommended at this time:   ¶ Breastfeeding:  ~Feed for a maximum of 10 minutes on right breast then offer supplement via bottle.              ¶ Supplemental pumping:   ~Pump both breasts for 15-20 minutes using hands on pumping technique at least 8 times per day  ~Use nipple balm or  food safe oil around the base of your nipple for lubrication when pumping  ~Breast pump flanges:       -Discussed flange fit and visual education provided on when adjusting flange size may be needed.       -decrease flange size to 17mm, flange insert education provided.               ¶ Supplemental feedings:  ~Offer at least 3  oz per bottle after each breastfeeding session  ~Increase volume offered in bottle if infant is still showing feeding cues  ~Decrease nipple flow on bottle to Preemie   ¶ Infant:  Exercises:Supervised tummy time 3-4 times per day  Additional therapies: None at this time   Keep daily journal of: Breastfeeding - how many minutes each side and frequency  Pumping- how much collected each side  Bottlefeeding- how much baby takes each time and frequency  Wet diapers- how many per 24 hours  Dirty diapers- how many per 24 hours, note any changes in color or consistency  Change pacifier to one that is round like valeria menchaca, colleen burnette or Dr Stuart coy              ¶ Maternal:  Low / delayed milk supply education and plan  Damaged nipple healing plan  Contact maternal OB/primary care doctor regarding low milk supply labs   Galactogogue education  Consult with maternal and pediatric doctors regarding galactogogue use discussed at todays appointment       ADDITIONAL EDUCATION:   ¶ Damaged nipples: Healing damaged/sore nipples    What causes sore and damaged nipples  Poor positioning  Disorganized infant sucking, including biting   Infant tongue tie  Improper breast pump flange sizes and/or turning the pump suction too high  Nipple infection such as yeast  Vasospasm of the nipples  Inverted nipples  Skin infections around your nipple    Make sure to continue to work on whatever underlying issue is causing your pain or nipple damage.    Treatment of sore/damaged nipples:  Moist wound healing is now the recommended treatment for sore/damaged nipples, according to the Academy of  Breastfeeding Medicine. This is referring to the internal moisture of the nipple and not the outside skin.     Begin feeding from the least sore side first, change latching positions to see which is more comfortalbe.   After breastfeeding or pumping, express a few drops of breast milk and rub into your nipple. Allow this to airy dry before putting on clothing (unless you have vasospasm).   You can also use breast shells to help prevent your nipples from sticking and rubbing on your clothing.   Do not wash your nipples with soap as this can dry your nipples out.   Make sure you are getting a proper latch with breastfeeding and make sure you are breaking baby's latch before removing them from the breast.   Latch video: Global health media: Attaching Your Baby at the Breast - Video - Inherited Health Project   Change positions that you feed your baby in. For example, if you always feed in cross cradle hold, try using football hold. This will allow your baby's mouth to hit different areas on your nipple and may reduce pain.   Unlatch you baby from your breast when you feel they are no longer actively eating. If they are using you as a pacifier, this may increase nipple soreness.   If using all the above suggestions did not help and breastfeeding is too painful to latch, you may benefit from giving your nipples a break for a few days until they are healed. You can pump and feed your baby expressed milk.     The following are no longer a recommended treatment protocol from the Academy of Breastfeeding however, you may find some relief using these, just be cautious that these may cause further swelling and inflammation worsening your symptoms.     Several times per day use a saltwater soak to your nipples.   Saline salt: Mix 1/2 teaspoon of salt in one cup of warm water. Make a fresh supply each day to avoid bacterial contamination. You may also buy individual-use packets of sterile saline solution. Soak nipple(s) in a  small bowl of warm saline solution for a minute or long enough for the saline to get onto all areas of the nipple. Avoid prolonged soaking (more than 5-10 minutes) that super hydrates the skin, as this can promote cracking and delay healing.  Epsom salt: Mix 2 tsp of Epsom salt into 1 cup of warm water and soak the breast or nipple 2-3 times a day for 5-10 minutes.          Using the following may also be helpful with damaged nipples. These will help keep your nipple moist to promote healing and help prevent scab formation.  Vaseline   Good at preventing scab formation  Olive oil   Studies show it has a similar effect in healing sore nipples as expressed milk  coconut oil  studies show it helps with healing and preventing nipple cracks  You can put on nursing pad then place in fridge to create a cold pack  It has anti-inflammatory, anti-bacterial and anti-fungal properties  Hydrogels  Caution for bacterial growth, make sure you are changing out your pads frequently.  You can place them in fridge to chill to aid in healing.  Silverettes  Make sure you get an authentic silver if you buy an off brand of Silverettes  Cannot be used with other products as it makes the silver ineffective.  APNO (all-purpose nipple ointment)        Use as last resort if above recommendations didn't work.  This consists of 3 ingredients:   An antibiotic: POLYSPORIN Antibiotics help to heal nipple pain by stopping the growth of bacteria. Preventing the growth of bacteria on the nipples can also help protect against mastitis.   An anti-inflammatory:  CORTISONE 10 This type of medication eases nipple pain by reducing any swelling caused by injury, infection, or skin irritation.  An anti-fungal: LOTRIMIN (CLOTRIMAZOLE) OR MONISTAT (MICONAZOLE) The anti-fungal ingredient in APNO helps to fight off Candida. Candida is the yeast that causes the fungal infection called thrush.   Mix equal parts of the ingredients listed above. These three  ingredients work together to help soothe the pain and fight off the common organisms that cause sore nipples.  To use all-purpose nipple ointment, apply it sparingly (just enough to makes your nipples and areola area shiny) after each pumping or nursing session. Don't wash or wipe it off.  You should not need to use APNO indefinitely.           MediHoney Paste (100% Active Leptospermum Honey) is the most appropriate of the Medihoney products for this use. When using MediHoney, we recommend the following:   After nursing, gently cleanse the nipple area with a warm washcloth to remove any of the babys saliva.   Extrude a small amount of MediHoney from the tube and gently apply to the nipple and the areola (the colored area around the nipple)   Apply a nursing pad over the nipple area and wear a bra to help secure the pad   Just prior to nursing your baby, use a warm wet washcloth to clean off any MediHoney prior to allowing the baby to latch.               MediHoney Paste is 100% honey and contains no additives and is filtered and sterilized to remove any bacteria or spores.    References: 1. Suziei M, Jagjit CM, De Leon J, Alvarado D. Factors that influence breastfeeding decisions among Special Supplemental Nutrition Program for Women, Infants and Children participants from [facility]. J Am Diet Assoc 2010; 110:624-7. 2. Eric MM, Connor THERON, Shelley J, Bryon E. Methods to prevent and manage nipple pain in breastfeeding women. [place] J Nurs Res 1990; 12:732-44.    Resources:  Sore Nipples - International BreastFeeding [place] (ibconline.ca)  Healing Tips for Nipple Cracks or Abrasions - KellyMom.com  AB Clinical Protocol #26: Persistent Pain with Breastfeeding (bfmed.org)  Comparative Effectiveness of Olive Oil and Breast Milk on Nipple Soreness in Breastfeeding Mothers  Breastfeeding Medicine       ¶ Delayed/low milk supply education and plan: Low/delayed milk production  What is low or delayed milk supply  Low  milk supply is when you are not producing enough milk for your infant needs after your milk has come in  Delayed milk supply is when your milk supply has not come in within 3-5 days of delivery  Reasons for Low milk production  The most common reason is insufficient milk removal which can be caused by weak infant suck, tongue tie, improper latch, giving bottles or pacifiers in place of going to breast, skipping breastfeeding's and not pumping in place of them, or getting off to a slow start with breastfeeding  Increased blood loss during delivery (over 500 ml) or retained placental fragments  A history of polycystic ovarian syndrome (PCOS), diabetes, thyroid disorders, insulin resistance, theca lutein cysts, anemia  Insufficient glandular tissue (lack of breast tissue growth during puberty and pregnancy)  Breast surgeries, biopsies, or breast trauma  Pregnancy  Certain medications, including placenta pills and birth control pills  Stress can increase your cortisol production which in turn decreases milk supply  Tobacco, drug, and/or alcohol use  Reasons for delayed milk production  Obesity- this causes estrogen to remain high in your breast which can cause a delay in milk production  Stress- causes your cortisol levels to rise and decreases milk production  Loss of blood during delivery can cause anemia which disrupts your hormones  Continued IV fluids during delivery can cause swelling in breast that delays milk production  How your body makes milk  Once your baby is born and your placenta is delivered, this causes a hormone shift that allows for your milk supply to begin.   As milk is removed from your breast by your baby or a breast pump, this tells your body to make more milk. Also, if milk is not removed by your baby or a breast pump, this tells your body not to make more milk.   How frequently and how effective your baby nurses also affects your milk supply. If your infant can remove enough milk and nurses  frequently, this will establish an adequate milk supply. However, if your infant cannot get enough milk from the breast, you will need to pump for adequate milk production.  Your body reaches peak milk production around 4 weeks after delivery. Most of the increase in milk supply will be seen around 2 weeks. If enough milk is not removed during this time, you may see a decrease in milk production.   Signs of low milk supply  If nursing:  Your baby is not gaining an adequate amount of weight  You do not hear frequent swallows at the breast even though your baby seems to be nursing  Your baby is not having adequate amounts of wet/dirty diapers  Your baby is falling asleep at the breast in the first few minutes of the feeding  If pumping:  You are unable to pump enough milk to satisfy your babyHow to increase milk production  How to increase milk supply  Increasing a low supply is hard work, but with commitment it can be done. It is a short term plan to meet your long term breastfeeding goal.   General things you can do to increase supply:  Get adequate nutrition and hydration.  More skin to skin with your infant.   This will help release the hormone oxytocin that helps your milk to flow.   Nurse infant more frequently.   Make sure you are keeping baby active at the breast and hearing frequent swallows. If this is not achievable, you may want to stop nursing and pump your breast for adequate milk removal and feed infant via bottle.   Make sure you have adequate milk removal with atleast 8-12 feedings/pumpings per day.   You may need to add pumping to ensure enough milk is being removed. Make sure you are using the correct size flanges as too big or too small can affect milk removal. Add in power pumping 1-2 times per day, this is when you pump 20 minutes then rest 10 minutes then pump 10 minutes and rest 10 minutes then pump again for 10 minutes.   Use relaxation techniques to reduce stress and help promote the flow of  milk  If you do not see an increase in milk supply, within a week, with the above measures taken you may want to:   begin a galactogogue.  Galactagogues:   MotherLove [place] and Legandairy Cash Cow (choose 1)   Grains: Oats, Barley, brown rice, quinoa, millet  Legumes: chickpeas, lentils, beans (green, kidney, black), peas  Vegetables: asparagus, carrots, yams, sweet potatoes, dark leafy greens (spinach, sandra, mustard greens, broccoli, seaweed)  Nuts and seeds : flaxseeds, almonds, bonifacio seeds, cashews  Fruit: dates, figs, avocado  Fats: butter, olive oil, coconut oil  herbs and spices: marjoram, basil, fennel, anise, dill, dana, cumin, garlic, onion  Avoid large amounts of: parsley, bassam, thyme, peppermint, rosemary  request lab work from your doctor  blood count for anemia  thyroid stimulating hormone (TSH) - levels too high or too low can affect milk production  human chorionic gonadotropin (hCG) for retained placenta  prolactin levels -tests should be done before breastfeeding, and again 45 minutes later to gauge the surge.  Testosterone - If elevated, an ultrasound can confirm the presence of gestational ovarian theca lutein cysts. These benign cysts, while rare, can delay the onset of milk increase by weeks.  Insulin - Insulin resistance, which results in high levels of blood sugar, can be a factor in low milk production.    ¶ Flange fit education: Correct fit of a breast flange for pumping breast milk    Flange fit video https://youtu.be/[email or web address]         This drawing shows the proper fit of a flange for pumping breast milk. (The flange is the cone-shaped piece that fits over the breast and connects to the pump.) The nipple should be centered and move easily within the tunnel. If the flange is too small, the nipple will rub against the sides of the tunnel. This can cause pain and even injury to the nipple. If the flange is too large, it will pull your areola tissue into the flange tunnel and air can  leak out the sides and milk won't flow as well.  ¶ Flanges: Flange insert kits:  Thin and flexible Amazon.com : Nuliie 10PCS Flange Sizing Kit 13/15/17/19/21mm for 24mm Flange/Shield of Most Pumps, Silicone Flange Insert for Momcozy/Spectra/Bellababy Breast Pump, Breastfeeding Essential Pumping Kit for Moms : Baby     Thicker to allow for more areola control but not clear Amazon.com: Maymom: Flange Insert    Off brand hard flanges:  Spectra offbrand hard flanges:   One piece:Amazon.com : Maymom 21mm Flange and Duckbill Valve Compatible with Spectra S1 Spectra S2 Spectra 9 Plus Not Original Spectra 20mm Replace Pump Parts Spectra Flange Spectra Duckbill Valve : Baby     Separate flanges and connector: Amazon.com : Kadrianam MyFit Comfy Series 15 mm Two-Piece Design Breastshield Compatible with Medela Breast Pump Parts; Replace Medela Shields; Fit MaxFlow Flex Connector; 2pc : Baby     Medela offbrand hard flanges: Amazon.com: DropGifts MyFit 19 mm Small Shields, Compatible with Medela Breast Pump- PersonalFit, Freestyle, [place], Maxi, Flex Connector, Connect to [place] Connector, 2pcs : Baby     Amazon.com: Nenesupply 17mm Flange Compatible with Medela Breast Pump Parts Replacement 17mm Flange for Medela Accessories Compatible with Pump in Style Parts Symphony Swing [place] Work with Personalfit Flex : Industrial & Scientific     Elastic nipple:  Pumpin pals: Small Set - Pumpin' Pal Angled Breast Pump Flanges (pumpinpal.com)     Pumping pretty: Amazon.com : Pumping Pretty Pump Flange Inserts, Compatible for Wearable Breast Pumps, fits 24mm to 30mm Flanges, Breast Pump Accessories, Flange Inserts Suitable for All Nipples, 2-Pc Set (13 mm) : Baby     Maymom inserts: Amazon.com : Maymom Flange Insert 21 mm Compatible with Medela and Spectra 24 mm Shields/Flanges. Use with Medela Freestyle and Momcozy S9/S11/S12 to Reduce Nipple Tunnel Down to 21 mm; Fit Momcozy 24 mm Cup. 2pc : Baby     Maymom hard flanges (pano or  "comfy) fits medela and specta: Amazon.com : Jobs2Web MyFit Comfy Series 15 mm Two-Piece Design Breastshield Compatible with Medela Breast Pump Parts; Replace Medela Shields; Fit MaxFlow Flex Connector; 2pc : Baby     BeauGen: Amazon.com : BeauGen Breast Pump Cushion - Soft Stretchy Clear Plastic Insert for Flange Comfort, Fit - Breastfeeding Essential - BPA Free, Food Safe - Fits 21mm, 22mm, 23mm, 24mm, 25mm, 26mm, 27mm, 28mm (1 Pair) : Baby     Longer flange inserts: Amazon.com : Jobs2Web Flange Inserts 19 mm for Medela, Spectra 24 mm George/Flanges, Momcozy/[place] Wearable Cup. Compatible with Medela Freestyle, [place] to Reduce 24mm Nipple Tunnel Down to 19 mm; 2pc/Each : Baby     BREASTFEEDING:  Alternate your starting breast with each feeding.  Video reference: "Attaching Your Baby at the Breast" by [employer] is a breastfeeding video that can be very helpful with positioning and latch techniuqe; https://[email or web address]/portfolio-items/attaching-[email or web address]-baby-at-the-breast/        SUPPLEMENTATION:  Use paced bottle feeding and hold bottle horizontally. Elicit gape and proper latching (stroke nipple downward on lips, wait for open mouth before inserting bottle nipple.    Paced Bottle Feeding References:  "Paced Bottle Feeding" by the [employer], https://www.youtube.com/watch?v=wtgM86Ipd8i  "Mama Natural" information and video, https://www.BlackSquare.Versafe/paced-bottle-feeding/    MILK EXPRESSION & STORAGE  Pumping:  If pumping more than baby will need, store milk in refrigerator or freezer as discussed.   Hand Expression:  Video Reference: "How to Express Breastmilk" by [employer], https://[email or web address]/portfolio-items/how-to-express-breastmilk/     Milk Storage Guidelines:  Room Temperature: 4 hours  Refrigerator: 4 days  Freezer: 3-12 months, depending on type of freezer  Layering Breast milk  You may add new freshly expressed milk to previously chilled or frozen milk. Chill the new " milk prior to adding it to the container of milk. The expiration date on the container of milk will be from the date of the oldest milk. It is best to freeze milk in feeding sized quantities. If you are just starting to pump, you may not yet have an idea of what will be the right size for your baby. Freeze in 1-2 oz. quantities to start. You dont want to thaw out more milk than your baby will take in 24 hours. After you have some experience with how much your baby takes from a bottle, you can freeze milk in that quantity.  Thawed  The oldest milk should be used first. Breast milk can be thawed and brought to room temperature by briefly standing the container of milk in warm water. Never make it warmer than body temperature. Never use a microwave to thaw or warm breast milk. Discard any milk left in a bottle within 1 hour after a feeding. Thawed, refrigerated breast milk must be discarded after 24 hours. Do not re-freeze it.   Transporting  Chill any milk that you pump in a refrigerator or a portable cooler bag. A cooler bag with frozen gel packs can be used to transport the milk home    BREASTFEEDING RESOURCES:    [employer] media: https://[email or web address].org/topic/breastfeeding/   - [employer]     TETHERED ORAL TISSUE (TOT):  Https://www.youtube.com/watch?v=RGhe2yn7LDZ&t=567o  Https://www.youStarbelly.comube.com/watch?v=QoUFiCWGIRM  https://www.youtube.com/watch?v=Zu0oliyTIdT  Https://www.youStarbelly.comube.com/watch?v=zrUC9AW2m85     Contact Numbers:     Lactation Warmline [phone]   7 days a week, call for phone support. Messages will be returned daily by 4pm.    [facility] office phone [phone]   M-F 8am-4pm, Call with any scheduling needs or feeding/pumping concerns.  Messages will be returned M-F 8am-4pm    [facility] mobile phone [phone]   7 days a week. Call or text for urgent scheduling needs, urgent feeding/pumping concerns, and to discuss plan of care as needed or directed. Provide baby's name and date of  birth with initial contact.   Messages or calls received late in the day may not be returned until the following day.           [1]   Patient Active Problem List  Diagnosis    Obesity affecting pregnancy, antepartum    Placenta previa antepartum in third trimester    Vasa previa    Status post primary low transverse  section

## 2025-07-11 NOTE — Clinical Note
CarrieJamaica is struggling with low milk supply and never experienced engorgement after delivery. Recommended asking you for low milk supply labs as listed below. She is motivated and keen to get these done. Has been pumping so much her nipples are raw. Please help if you can.      CBC for anemia   Thyroid stimulating hormone (TSH) - levels too high or too low can affect milk production   Human chorionic gonadotropin (hCG) for retained placenta   Prolactin levels -tests should be done before breastfeeding, and again 45 minutes later to gauge the surge.   Testosterone - If elevated, an ultrasound can confirm the presence of gestational ovarian theca lutein cysts. These benign cysts, while rare, can delay the onset of milk increase by weeks.   Insulin - Insulin resistance, which results in high levels of blood sugar, can be a factor in low milk production.  Thanks,  Komal

## 2025-07-11 NOTE — Clinical Note
f/u 7/23 @1015. Changing to full consult as mom wants to breastfeed. Going to have labs done for low supply. Suction too high when pumping, nipples damaged.

## 2025-07-15 ENCOUNTER — LAB VISIT (OUTPATIENT)
Dept: LAB | Facility: HOSPITAL | Age: 30
End: 2025-07-15
Attending: PEDIATRICS
Payer: COMMERCIAL

## 2025-07-15 LAB
ABSOLUTE EOSINOPHIL (OHS): 0.37 K/UL
ABSOLUTE MONOCYTE (OHS): 0.47 K/UL (ref 0.3–1)
ABSOLUTE NEUTROPHIL COUNT (OHS): 5.6 K/UL (ref 1.8–7.7)
BASOPHILS # BLD AUTO: 0.09 K/UL
BASOPHILS NFR BLD AUTO: 1.1 %
ERYTHROCYTE [DISTWIDTH] IN BLOOD BY AUTOMATED COUNT: 12.5 % (ref 11.5–14.5)
HCG INTACT+B SERPL-ACNC: <2.42 MIU/ML
HCT VFR BLD AUTO: 39.2 % (ref 37–48.5)
HGB BLD-MCNC: 12.4 GM/DL (ref 12–16)
IMM GRANULOCYTES # BLD AUTO: 0.03 K/UL (ref 0–0.04)
IMM GRANULOCYTES NFR BLD AUTO: 0.4 % (ref 0–0.5)
INSULIN SERPL-ACNC: 84.1 UU/ML
LYMPHOCYTES # BLD AUTO: 1.9 K/UL (ref 1–4.8)
MCH RBC QN AUTO: 26.7 PG (ref 27–31)
MCHC RBC AUTO-ENTMCNC: 31.6 G/DL (ref 32–36)
MCV RBC AUTO: 85 FL (ref 82–98)
NUCLEATED RBC (/100WBC) (OHS): 0 /100 WBC
PLATELET # BLD AUTO: 367 K/UL (ref 150–450)
PMV BLD AUTO: 10.6 FL (ref 9.2–12.9)
PROLACTIN SERPL IA-MCNC: 153.9 NG/ML (ref 5.2–26.5)
RBC # BLD AUTO: 4.64 M/UL (ref 4–5.4)
RELATIVE EOSINOPHIL (OHS): 4.4 %
RELATIVE LYMPHOCYTE (OHS): 22.5 % (ref 18–48)
RELATIVE MONOCYTE (OHS): 5.6 % (ref 4–15)
RELATIVE NEUTROPHIL (OHS): 66 % (ref 38–73)
TESTOST SERPL-MCNC: 19 NG/DL (ref 5–73)
TSH SERPL-ACNC: 1.13 UIU/ML (ref 0.4–4)
WBC # BLD AUTO: 8.46 K/UL (ref 3.9–12.7)

## 2025-07-15 PROCEDURE — 85025 COMPLETE CBC W/AUTO DIFF WBC: CPT

## 2025-07-15 PROCEDURE — 84403 ASSAY OF TOTAL TESTOSTERONE: CPT

## 2025-07-15 PROCEDURE — 83525 ASSAY OF INSULIN: CPT

## 2025-07-15 PROCEDURE — 36415 COLL VENOUS BLD VENIPUNCTURE: CPT | Mod: PO

## 2025-07-15 PROCEDURE — 84702 CHORIONIC GONADOTROPIN TEST: CPT

## 2025-07-15 PROCEDURE — 84146 ASSAY OF PROLACTIN: CPT

## 2025-07-15 PROCEDURE — 84443 ASSAY THYROID STIM HORMONE: CPT

## 2025-07-16 ENCOUNTER — TELEPHONE (OUTPATIENT)
Dept: LACTATION | Facility: CLINIC | Age: 30
End: 2025-07-16
Payer: COMMERCIAL

## 2025-07-17 ENCOUNTER — LAB VISIT (OUTPATIENT)
Dept: LAB | Facility: HOSPITAL | Age: 30
End: 2025-07-17
Attending: STUDENT IN AN ORGANIZED HEALTH CARE EDUCATION/TRAINING PROGRAM
Payer: COMMERCIAL

## 2025-07-17 ENCOUNTER — TELEPHONE (OUTPATIENT)
Dept: LACTATION | Facility: CLINIC | Age: 30
End: 2025-07-17
Payer: COMMERCIAL

## 2025-07-17 ENCOUNTER — OFFICE VISIT (OUTPATIENT)
Dept: FAMILY MEDICINE | Facility: CLINIC | Age: 30
End: 2025-07-17
Payer: COMMERCIAL

## 2025-07-17 VITALS
WEIGHT: 282.75 LBS | OXYGEN SATURATION: 98 % | HEART RATE: 90 BPM | SYSTOLIC BLOOD PRESSURE: 122 MMHG | HEIGHT: 71 IN | BODY MASS INDEX: 39.58 KG/M2 | DIASTOLIC BLOOD PRESSURE: 76 MMHG

## 2025-07-17 DIAGNOSIS — E88.819 INSULIN RESISTANCE: Primary | ICD-10-CM

## 2025-07-17 DIAGNOSIS — E88.819 INSULIN RESISTANCE: ICD-10-CM

## 2025-07-17 DIAGNOSIS — Z00.00 WELLNESS EXAMINATION: ICD-10-CM

## 2025-07-17 LAB
ALBUMIN SERPL BCP-MCNC: 4.1 G/DL (ref 3.5–5.2)
ALP SERPL-CCNC: 76 UNIT/L (ref 40–150)
ALT SERPL W/O P-5'-P-CCNC: 16 UNIT/L (ref 10–44)
ANION GAP (OHS): 8 MMOL/L (ref 8–16)
AST SERPL-CCNC: 15 UNIT/L (ref 11–45)
BILIRUB SERPL-MCNC: 0.2 MG/DL (ref 0.1–1)
BUN SERPL-MCNC: 17 MG/DL (ref 6–20)
CALCIUM SERPL-MCNC: 9.2 MG/DL (ref 8.7–10.5)
CHLORIDE SERPL-SCNC: 108 MMOL/L (ref 95–110)
CHOLEST SERPL-MCNC: 189 MG/DL (ref 120–199)
CHOLEST/HDLC SERPL: 4.3 {RATIO} (ref 2–5)
CO2 SERPL-SCNC: 23 MMOL/L (ref 23–29)
CREAT SERPL-MCNC: 0.8 MG/DL (ref 0.5–1.4)
EAG (OHS): 108 MG/DL (ref 68–131)
GFR SERPLBLD CREATININE-BSD FMLA CKD-EPI: >60 ML/MIN/1.73/M2
GLUCOSE SERPL-MCNC: 86 MG/DL (ref 70–110)
HBA1C MFR BLD: 5.4 % (ref 4–5.6)
HDLC SERPL-MCNC: 44 MG/DL (ref 40–75)
HDLC SERPL: 23.3 % (ref 20–50)
LDLC SERPL CALC-MCNC: 116 MG/DL (ref 63–159)
NONHDLC SERPL-MCNC: 145 MG/DL
POTASSIUM SERPL-SCNC: 4.4 MMOL/L (ref 3.5–5.1)
PROT SERPL-MCNC: 7 GM/DL (ref 6–8.4)
SODIUM SERPL-SCNC: 139 MMOL/L (ref 136–145)
TRIGL SERPL-MCNC: 145 MG/DL (ref 30–150)

## 2025-07-17 PROCEDURE — 80061 LIPID PANEL: CPT

## 2025-07-17 PROCEDURE — 36415 COLL VENOUS BLD VENIPUNCTURE: CPT | Mod: PN

## 2025-07-17 PROCEDURE — 83036 HEMOGLOBIN GLYCOSYLATED A1C: CPT

## 2025-07-17 PROCEDURE — 80053 COMPREHEN METABOLIC PANEL: CPT

## 2025-07-17 PROCEDURE — 99999 PR PBB SHADOW E&M-EST. PATIENT-LVL IV: CPT | Mod: PBBFAC,,, | Performed by: STUDENT IN AN ORGANIZED HEALTH CARE EDUCATION/TRAINING PROGRAM

## 2025-07-17 NOTE — PROGRESS NOTES
Patient ID: Jamaica Leonard is a 30 y.o. female.    Chief Complaint: Establish Care    History of Present Illness    CHIEF COMPLAINT:  Ms. Leonard presents today for evaluation of lactation issues.    LACTATION:  She currently produces 1.5-2.5 ounces per pump session, which does not meet her infant's feeding requirement of 4 ounces per feeding. She is working with a lactation nurse and implementing various interventions including nipple repair and no's yeast supplement. Power pumping in the evening increases milk production to approximately 2.5-3 ounces. Despite multiple efforts, milk production remains insufficient.    PREGNANCY AND DELIVERY:  This is her first pregnancy. She underwent a  delivery and received 8 weeks of postpartum recovery time. Initial glucose screening during pregnancy was slightly elevated after poor diet during  weekend, but subsequent three-hour glucose tolerance test was normal with no diagnosis of gestational diabetes.    RECENT LABS:  Hemoglobin, thyroid function, and A1C were normal. Prolactin was appropriately elevated for lactation. Random insulin was elevated post-prandial. A1C to be rechecked.    CURRENT MEDICATIONS:  She is currently taking prenatal vitamins only.      ROS:  General: -fever, -chills, -fatigue, -weight gain, -weight loss  Eyes: -vision changes, -redness, -discharge  ENT: -ear pain, -nasal congestion, -sore throat  Cardiovascular: -chest pain, -palpitations, -lower extremity edema  Respiratory: -cough, -shortness of breath  Gastrointestinal: -abdominal pain, -nausea, -vomiting, -diarrhea, -constipation, -blood in stool  Genitourinary: -dysuria, -hematuria, -frequency  Musculoskeletal: -joint pain, -muscle pain  Skin: -rash, -lesion  Neurological: -headache, -dizziness, -numbness, -tingling  Psychiatric: -anxiety, -depression, -sleep difficulty         Pmh, Psh, Family Hx, Social Hx updated in Epic Tabs today.       2023     8:18 AM   Depression  Patient Health Questionnaire   Over the last two weeks how often have you been bothered by little interest or pleasure in doing things Not at all   Over the last two weeks how often have you been bothered by feeling down, depressed or hopeless Not at all   PHQ-2 Total Score 0       Active Problem List with Overview Notes    Diagnosis Date Noted    Vasa previa 2025    Status post primary low transverse  section 2025    Placenta previa antepartum in third trimester 2025     -TVUS at 32w to confirm placenta location   25- posterior low lying placenta, placental edge to cervical os is 11mm. Will repeat TVUS at 36w  25- low lying, 17mm from cervical os       Obesity affecting pregnancy, antepartum 2024     Baby ASA          Past Medical History:   Diagnosis Date    ADHD     Insomnia     Vasa previa affecting labor and delivery in mo pregnancy 2025       Past Surgical History:   Procedure Laterality Date    ADENOIDECTOMY      APPENDECTOMY       SECTION N/A 2025    Procedure:  SECTION;  Surgeon: Sushil Choudhary MD;  Location: [facility] L&D;  Service: OB/GYN;  Laterality: N/A;     SECTION  25    TONSILLECTOMY         Family History   Problem Relation Name Age of Onset    Diabetes Mother Hope Saleh     Dementia Paternal Grandmother         Social History     Socioeconomic History    Marital status:    Tobacco Use    Smoking status: Former     Types: Vaping with nicotine    Smokeless tobacco: Never   Substance and Sexual Activity    Alcohol use: No    Drug use: No    Sexual activity: Yes     Partners: Male     Birth control/protection: None     Social Drivers of Health     Financial Resource Strain: Low Risk  (2025)    Overall Financial Resource Strain (CARDIA)     Difficulty of Paying Living Expenses: Not hard at all   Food Insecurity: No Food Insecurity (2025)    Hunger Vital Sign     Worried About Running Out of Food in  "the Last Year: Never true     Ran Out of Food in the Last Year: Never true   Transportation Needs: No Transportation Needs (6/18/2025)    PRAPARE - Transportation     Lack of Transportation (Medical): No     Lack of Transportation (Non-Medical): No   Physical Activity: Insufficiently Active (6/17/2025)    Exercise Vital Sign     Days of Exercise per Week: 3 days     Minutes of Exercise per Session: 30 min   Stress: No Stress Concern Present (6/18/2025)    [employer] [place] [employer] - Occupational Stress Questionnaire     Feeling of Stress : Not at all   Housing Stability: Low Risk  (6/18/2025)    Housing Stability Vital Sign     Unable to Pay for Housing in the Last Year: No     Number of Times Moved in the Last Year: 0     Homeless in the Last Year: No       Medications Ordered Prior to Encounter[1]    Review of patient's allergies indicates:   Allergen Reactions    Vancomycin analogues Swelling         Review of Systems    General - Well developed, alert and oriented in NAD  HEENT - normocephalic, no evidence of trauma, sclera white, EOMI  Neck - full range of motion  COR - regular rate and rhythm without murmurs or gallops  Lungs - Clear  Abdomen - soft, non-tender  Ext - no cyanosis or edema    Physical Exam     Assessment:     1. Wellness examination    2. Insulin resistance        LABS:   Lab Results   Component Value Date    HGBA1C 5.2 04/23/2025    HGBA1C 5.0 11/21/2024      No results found for: "CHOL"  No results found for: "LDLCALC"  Lab Results   Component Value Date    WBC 8.46 07/15/2025    HGB 12.4 07/15/2025    HCT 39.2 07/15/2025     07/15/2025    ALT 9 (L) 06/18/2025    AST 11 06/18/2025     06/18/2025    K 4.0 06/18/2025     06/18/2025    CREATININE 0.6 06/18/2025    BUN 10 06/18/2025    CO2 18 (L) 06/18/2025    TSH 1.130 07/15/2025    HGBA1C 5.2 04/23/2025       Plan:   Jamaica was seen today for establish care.    Diagnoses and all orders for this visit:    Wellness " examination  -     Comprehensive Metabolic Panel; Future  -     Hemoglobin A1C; Future  -     Lipid Panel; Future    Insulin resistance  -     Hemoglobin A1C; Future  -     Lipid Panel; Future        Assessment & Plan    HYPOGALACTIA (LOW MILK SUPPLY):  - Assessed lactation concerns and lab results.  - Ms. Leonard is producing about 1.5-2.5 ounces of milk per pump session, which is not keeping up with the baby's need of 4 ounces.  - Hemoglobin, thyroid levels, and prolactin levels are all within normal limits, with prolactin appropriately elevated for lactation.  - Explained that current milk supply is not significantly low and can take time to establish and match baby's needs.  - Educated the patient on the role of prolactin in milk production.  - Advised to continue regular pumping schedule as recommended by lactation consultant.  - Recommend maintaining adequate hydration, increasing protein intake, and continuing prenatal vitamins.  - Encouraged the patient to keep appointments with lactation consultants.    HYPERGLYCEMIA / PREDIABETES:  - Noted the patient's random insulin level is elevated, even postprandial, despite normal A1C results during pregnancy with no diagnosis of gestational diabetes.  - Assessed potential insulin resistance due to pregnancy-related weight gain and explained its potential impact on overall health.  - Ordered A1C test, CMP, and lipid panel for comprehensive evaluation of prediabetes status.  - Plan to monitor A1C every 6 months to check for progression towards diabetes.  - Discussed that metformin may be considered if insulin resistance and prediabetes become a concern.           Face to Face time with patient: 10:40 AM CDT -      Each patient to whom he or she provides medical services by telemedicine is:  (1) informed of the relationship between the physician and patient and the respective role of any other health care provider with respect to management of the patient; and (2)  notified that he or she may decline to receive medical services by telemedicine and may withdraw from such care at any time.        There are no Patient Instructions on file for this visit.    Follow up in about 6 months (around 1/17/2026), or if symptoms worsen or fail to improve.  The ASCVD Risk score (Remberto POOLE, et al., 2019) failed to calculate for the following reasons:    The 2019 ASCVD risk score is only valid for ages 40 to 79    This note was generated with the assistance of ambient listening technology. Verbal consent was obtained by the patient and accompanying visitor(s) for the recording of patient appointment to facilitate this note. I attest to having reviewed and edited the generated note for accuracy, though some syntax or spelling errors may persist. Please contact the author of this note for any clarification.           [1]   Current Outpatient Medications on File Prior to Visit   Medication Sig Dispense Refill    famotidine (PEPCID) 40 MG tablet Take 1 tablet (40 mg total) by mouth 2 (two) times daily. 30 tablet 6    senna-docusate (PERICOLACE) 8.6-50 mg per tablet Take 1 tablet by mouth nightly as needed for Constipation. 30 tablet 0    ibuprofen (ADVIL,MOTRIN) 800 MG tablet Take 1 tablet (800 mg total) by mouth every 8 (eight) hours. (Patient not taking: Reported on 7/17/2025) 30 tablet 0    prenatal 25/iron/folate 6/dha (PRENA1 ORAL) Take by mouth. (Patient not taking: Reported on 7/17/2025)       No current facility-administered medications on file prior to visit.

## 2025-07-18 ENCOUNTER — TELEPHONE (OUTPATIENT)
Dept: LACTATION | Facility: CLINIC | Age: 30
End: 2025-07-18
Payer: COMMERCIAL

## 2025-07-18 NOTE — TELEPHONE ENCOUNTER
Told patient I where she was informed that yesterday's labs were normal and informed that we won't make any changes to the plan at this time. She states she is still getting 1.5-2 oz each time but that she did get 3.5 oz at 8am this morning, which is the most she's gotten. Provided encouragement with the increase noted this morning. Encouraged to keep us updated on how things are going with everything previously discussed. Expresses understanding and appreciation.

## 2025-08-04 ENCOUNTER — POSTPARTUM VISIT (OUTPATIENT)
Dept: OBSTETRICS AND GYNECOLOGY | Facility: CLINIC | Age: 30
End: 2025-08-04
Payer: COMMERCIAL

## 2025-08-04 VITALS
DIASTOLIC BLOOD PRESSURE: 70 MMHG | HEIGHT: 71 IN | BODY MASS INDEX: 40.21 KG/M2 | WEIGHT: 287.25 LBS | SYSTOLIC BLOOD PRESSURE: 104 MMHG

## 2025-08-04 DIAGNOSIS — Z98.891 STATUS POST PRIMARY LOW TRANSVERSE CESAREAN SECTION: Primary | ICD-10-CM

## 2025-08-04 PROBLEM — O44.03 PLACENTA PREVIA ANTEPARTUM IN THIRD TRIMESTER: Status: RESOLVED | Noted: 2025-02-27 | Resolved: 2025-08-04

## 2025-08-04 PROBLEM — O99.210 OBESITY AFFECTING PREGNANCY, ANTEPARTUM: Status: RESOLVED | Noted: 2024-11-21 | Resolved: 2025-08-04

## 2025-08-04 PROCEDURE — 99999 PR PBB SHADOW E&M-EST. PATIENT-LVL III: CPT | Mod: PBBFAC,,, | Performed by: OBSTETRICS & GYNECOLOGY

## 2025-08-04 PROCEDURE — 0503F POSTPARTUM CARE VISIT: CPT | Mod: CPTII,S$GLB,, | Performed by: OBSTETRICS & GYNECOLOGY

## 2025-08-04 NOTE — PROGRESS NOTES
"CC: Post-partum follow-up    Jamaica Leonard is a 30 y.o. female  who presents for post-partum visit.  She is S/P a PLTCS.  She and the baby are doing well.  No pain.  No fever.   No bowel / bladder complaints.    Delivery Date: 2025  Delivery MD: Kenrick  Gender: female  Birth Weight: 6 pounds 15 ounces  Breast Feeding: YES  Depression: NO  Contraception: condoms    Pregnancy was complicated by:  Vasa Previa, Obesity    /70   Ht 5' 11" (1.803 m)   Wt 130.3 kg (287 lb 4.2 oz)   LMP 2024 (Exact Date)   Breastfeeding Yes   BMI 40.06 kg/m²     ROS:  GENERAL: No fever, chills, fatigability.  CARDIOVASCULAR: No chest pain. No shortness of breath. No leg cramps.  BREAST: Denies pain. No lumps. No discharge.  ABDOMEN: No abdominal pain. Denies nausea. Denies vomiting. No diarrhea. No constipation  URINARY: No incontinence, no nocturia, no frequency and no dysuria.  VULVAR: No pain, no lesions and no itching.  VAGINAL: No relaxation, no itching, no discharge, no abnormal bleeding and no lesions.  NEUROLOGICAL: No headaches. No vision changes.    PHYSICAL EXAM:  GENERAL: Alert and oriented in no distress  CHEST: Clear to auscultation  CV; Regular rate and rhythm  ABDOMEN:  Soft, non-tender, non-distended  WOUND: Healed well  VULVA:  Normal, no lesions  CERVIX:  Without lesions, polyps or tenderness.  UTERUS:  Normal size, shape, consistency, no mass or tenderness.  ADNEXA:  Normal in size without mass or tenderness  EXTREMITIES: Non-tender, Negative Christi's    A/P:  Status post primary low transverse  section  -     Pt doing well.  Cleared for all activities.  -     Pt was counseled on contraception options, including associated risks and benefits of each.  Pt voiced understanding and desires to proceed with condoms.  Pt advised to avoid pregnancy for one year after surgery.      Follow up for Annual exam.      "

## (undated) DEVICE — TUBING NEPTUNE 2 SMOKE 10IN

## (undated) DEVICE — DRESSING AQUACEL AG 3.5X10IN

## (undated) DEVICE — TAPE SILK 3IN

## (undated) DEVICE — PAD ABDOMINAL STERILE 8X10IN

## (undated) DEVICE — TAPE SURG MEDIPORE 6X72IN

## (undated) DEVICE — NEPTUNE 4 PORT MANIFOLD